# Patient Record
Sex: FEMALE | Race: WHITE | NOT HISPANIC OR LATINO | URBAN - METROPOLITAN AREA
[De-identification: names, ages, dates, MRNs, and addresses within clinical notes are randomized per-mention and may not be internally consistent; named-entity substitution may affect disease eponyms.]

---

## 2018-09-17 DIAGNOSIS — Q20.3 COMPLETE TRANSPOSITION OF GREAT VESSELS: Primary | ICD-10-CM

## 2018-09-20 ENCOUNTER — DOCUMENTATION ONLY (OUTPATIENT)
Dept: PEDIATRIC CARDIOLOGY | Facility: CLINIC | Age: 38
End: 2018-09-20

## 2018-09-20 NOTE — PROGRESS NOTES
Referred from Dr. Dumont.  Siria Villegas is a 37 y.o. female with:  1.  L- TGA  2.  Complete heart block   - medtronic device in place  3.  History of episodes of chest tightness, shortness of breath, dizziness, and right arm tingling    On lasix daily, digoxin  mcg, propafenone, metoprolol.

## 2018-09-24 RX ORDER — NORGESTIMATE AND ETHINYL ESTRADIOL 7DAYSX3 28
1 KIT ORAL DAILY
COMMUNITY
Start: 2018-09-10

## 2018-09-24 RX ORDER — CLINDAMYCIN HYDROCHLORIDE 300 MG/1
CAPSULE ORAL
COMMUNITY
Start: 2018-07-26 | End: 2018-10-18

## 2018-09-24 RX ORDER — FUROSEMIDE 20 MG/1
20 TABLET ORAL DAILY
COMMUNITY
Start: 2018-07-31 | End: 2023-05-01 | Stop reason: SDUPTHER

## 2018-09-24 RX ORDER — METOPROLOL SUCCINATE 25 MG/1
25 TABLET, EXTENDED RELEASE ORAL 2 TIMES DAILY
COMMUNITY
Start: 2018-09-02 | End: 2024-02-19 | Stop reason: SDUPTHER

## 2018-09-24 RX ORDER — PROPAFENONE HYDROCHLORIDE 150 MG/1
150 TABLET, COATED ORAL 2 TIMES DAILY
COMMUNITY
Start: 2018-08-27

## 2018-09-24 RX ORDER — DIGOXIN 250 MCG
250 TABLET ORAL DAILY
COMMUNITY
Start: 2018-09-10 | End: 2023-05-29 | Stop reason: SDUPTHER

## 2018-10-18 ENCOUNTER — HOSPITAL ENCOUNTER (OUTPATIENT)
Dept: CARDIOLOGY | Facility: CLINIC | Age: 38
Discharge: HOME | End: 2018-10-18
Payer: COMMERCIAL

## 2018-10-18 ENCOUNTER — OFFICE VISIT (OUTPATIENT)
Dept: CARDIOLOGY | Facility: CLINIC | Age: 38
End: 2018-10-18
Payer: COMMERCIAL

## 2018-10-18 VITALS
HEART RATE: 72 BPM | SYSTOLIC BLOOD PRESSURE: 109 MMHG | BODY MASS INDEX: 31.37 KG/M2 | OXYGEN SATURATION: 96 % | HEIGHT: 68 IN | WEIGHT: 207 LBS | DIASTOLIC BLOOD PRESSURE: 53 MMHG

## 2018-10-18 DIAGNOSIS — R53.83 OTHER FATIGUE: ICD-10-CM

## 2018-10-18 DIAGNOSIS — I36.1 NON-RHEUMATIC TRICUSPID VALVE INSUFFICIENCY: ICD-10-CM

## 2018-10-18 DIAGNOSIS — I44.2 COMPLETE HEART BLOCK: ICD-10-CM

## 2018-10-18 DIAGNOSIS — Q20.5 CONGENITALLY CORRECTED TRANSPOSITION OF GREAT ARTERIES: ICD-10-CM

## 2018-10-18 DIAGNOSIS — Q20.3 COMPLETE TRANSPOSITION OF GREAT VESSELS: ICD-10-CM

## 2018-10-18 DIAGNOSIS — Q24.9 ADULT CONGENITAL HEART DISEASE: ICD-10-CM

## 2018-10-18 LAB
DIASTOLIC DYSFUNCTION: NO
GLOBAL PERICARDIAL EFFUSION: NORMAL
MITRAL VALVE MOBILITY: NORMAL
MITRAL VALVE REGURGITATION: NORMAL
RETIRED EF AND QEF - SEE NOTES: 65 (ref 55–65)
TRICUSPID VALVE REGURGITATION: NORMAL

## 2018-10-18 PROCEDURE — 3008F BODY MASS INDEX DOCD: CPT | Mod: CPTII,S$GLB,, | Performed by: PEDIATRICS

## 2018-10-18 PROCEDURE — 99999 PR PBB SHADOW E&M-EST. PATIENT-LVL III: CPT | Mod: PBBFAC,,, | Performed by: PEDIATRICS

## 2018-10-18 PROCEDURE — 93000 ELECTROCARDIOGRAM COMPLETE: CPT | Mod: S$GLB,,, | Performed by: INTERNAL MEDICINE

## 2018-10-18 PROCEDURE — 93303 ECHO TRANSTHORACIC: CPT | Mod: S$GLB,,, | Performed by: PEDIATRICS

## 2018-10-18 PROCEDURE — 93325 DOPPLER ECHO COLOR FLOW MAPG: CPT | Mod: S$GLB,,, | Performed by: PEDIATRICS

## 2018-10-18 PROCEDURE — 93320 DOPPLER ECHO COMPLETE: CPT | Mod: S$GLB,,, | Performed by: PEDIATRICS

## 2018-10-18 PROCEDURE — 99205 OFFICE O/P NEW HI 60 MIN: CPT | Mod: 25,S$GLB,, | Performed by: PEDIATRICS

## 2018-10-18 RX ORDER — FERROUS GLUCONATE 324(38)MG
324 TABLET ORAL DAILY
COMMUNITY
End: 2023-07-10

## 2018-10-18 RX ORDER — GUAIFENESIN 600 MG/1
1200 TABLET, EXTENDED RELEASE ORAL DAILY
COMMUNITY
End: 2023-07-10

## 2018-10-18 RX ORDER — ASPIRIN 81 MG/1
81 TABLET ORAL DAILY
COMMUNITY

## 2018-10-18 RX ORDER — NAPROXEN 500 MG/1
500 TABLET ORAL 2 TIMES DAILY WITH MEALS
COMMUNITY
Start: 2018-10-04 | End: 2023-07-10

## 2018-10-18 NOTE — PROGRESS NOTES
10/18/2018    re:Siria Villegas  :1980     Brooks Dumont MD  [address]  [facility] [place] [address]    Dr. Caden Worley    [facility]    Siria Villegas is a 37 y.o. female is seen today for an initial consultation in my [facility] at the request of Dr. Matt Dumont, her primary cardiologist in [place].  To summarize, her diagnoses are as follows:  1.  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  Likely mildly reduced systemic right ventricular function - typical for this disease  3.  Normal appearing tricuspid valve with only mild tricuspid insufficiency  4.  History of supraventricular arrhythmias  5.  Complete heart block with transvenous pacemaker  6.  Multiple nonspecific complaints, doubt cardiac etiology.    My recommendations are as follows:  1.  Healthy diet, regular low to moderate intensity exercise.  2.  Continue regular follow-up with Dr. Dumont and Brunilda.  They will contact me with any new concerns.  3.  Consider a trial off of metoprolol to see if it helps her fatigue.  I spoke with Dr. Dumont about this, and he will discuss with Dr. Worley and get back to the patient.  4.  Stop Lasix.  Restart this medication of peripheral edema or worsening exercise tolerance developed.    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  That said, her heart looks great.  Quantifying systemic right ventricular function with echo is difficult, but overall the function looks quite good (I would graded as mildly reduced, but this is par for the course with her disease).  Her tricuspid valve, which is often quite normal with this type of congenital heart disease, looks great.  She has at most mild tricuspid insufficiency.  Complete heart block is quite common with her  heart disease, and this has been well managed.  It sounds like she has a significant arrhythmia history.  I wonder if she would feel better off the metoprolol, but I have left that up to her primary cardiologist to discuss.  We had a long discussion about potential issues related to her heart:  1.  We talked about the risk of worsening right ventricular function and heart failure.  This could progress to a point where heart transplant would be discussed.  I considered adding an ACE-inhibitor.  However, the data for the utility of Ace inhibitors for systemic right ventricular function is lacking, her blood pressure is already on the low side, and I am concerned about worsening some of her subjective symptoms.  At present, I decided against an ACE-inhibitor.  2.  We talked about the risk of progressive tricuspid valve insufficiency.  This can have a deleterious effect on heart function, and a vicious cycle of worsening heart function leading to worsening tricuspid insufficiency, leading back to worsening heart function can occur.  Tricuspid valve replacement is sometimes necessary in these patients.  However, her valve really looks great.  There is absolutely no need for intervention at this time. She does not appear to have an Ebsteinoid valve, and I am hopeful that no intervention will be necessary.  3.  We talked about pregnancy outcomes.  There is not a lot of data, but there are several small series that suggest excellent outcomes with patients with her heart disease.  Given her good right ventricular function and minimal tricuspid insufficiency, I suspect that she would tolerate a pregnancy quite well and be able to deliver vaginally.  However, she would absolutely be at risk for heart failure symptoms and arrhythmias during the pregnancy, and she would need to be followed closely.    History of present illness:  The history is provided by the patient.  I also reviewed extensive medical records from Dr. Dumont in  [place], and I spoke directly with him on the phone.  She was diagnosed with L-TGA at about 7 years of age.  Her only cardiac interventions have been pacemakers.  The 1st was placed when she was 8.  Her most recent generator change was in July 2018.  Amazingly, it sounds like all of her pacemakers were transvenous.  She has multiple complaints.  She does have some dyspnea on exertion.  If anything, this is actually better over the past few years.  This is attributed to weight loss.  She denies chest pain.  She denies syncope.  She denies edema. She does frequently get dizzy and lightheaded, and she has occasional palpitations.  Her biggest complaint is fatigue.  She sometimes will go over a week without taking her Lasix as she cannot take it at school during testing week (she is unable to leave the room for bathroom breaks).  She does not notice any worsening edema or shortness of breath when she does not take the Lasix.    The review of systems is as noted above. It is otherwise negative for other symptoms related to the general, neurological, psychiatric, endocrine, gastrointestinal, genitourinary, respiratory, dermatologic, musculoskeletal, hematologic, and immunologic systems.    Past Medical History:   Diagnosis Date    Asthma     Heart murmur     Transposition of the great vessels with intact ventricular septum and left ventricular outflow tract obstruct      Past Surgical History:   Procedure Laterality Date    CARDIAC PACEMAKER PLACEMENT  07/26/2018    CYST REMOVAL      age 2 years old, from eyebrow area     Family History   Problem Relation Age of Onset    Cancer Mother     Stroke Mother     Heart failure Mother     Asthma Father     Hearing loss Father     No Known Problems Sister     No Known Problems Brother     No Known Problems Maternal Aunt     No Known Problems Maternal Uncle     No Known Problems Paternal Aunt     No Known Problems Paternal Uncle     No Known Problems Maternal  Grandmother     No Known Problems Maternal Grandfather     No Known Problems Paternal Grandmother     No Known Problems Paternal Grandfather     Anemia Neg Hx     Arrhythmia Neg Hx     Clotting disorder Neg Hx     Fainting Neg Hx     Heart attack Neg Hx     Heart disease Neg Hx     Hyperlipidemia Neg Hx     Hypertension Neg Hx     Atrial Septal Defect Neg Hx     Cardiomyopathy Neg Hx     Congenital heart disease Neg Hx     Early death Neg Hx     Heart attacks under age 50 Neg Hx      Social History     Socioeconomic History    Marital status: Unknown     Spouse name: None    Number of children: None    Years of education: None    Highest education level: None   Social Needs    Financial resource strain: None    Food insecurity - worry: None    Food insecurity - inability: None    Transportation needs - medical: None    Transportation needs - non-medical: None   Occupational History    None   Tobacco Use    Smoking status: Never Smoker    Smokeless tobacco: Never Used   Substance and Sexual Activity    Alcohol use: No     Frequency: Never    Drug use: No    Sexual activity: None   Other Topics Concern    None   Social History Narrative    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     Current Outpatient Medications on File Prior to Visit   Medication Sig Dispense Refill    aspirin (ECOTRIN) 81 MG EC tablet Take 81 mg by mouth once daily.       digoxin (LANOXIN) 250 mcg tablet Take 250 mcg by mouth once daily. 1/2 tablet in the morning and 250 mcg at night      furosemide (LASIX) 20 MG tablet Take 20 mg by mouth once daily.       guaiFENesin (MUCINEX) 600 mg 12 hr tablet Take 1,200 mg by mouth once daily.      metoprolol succinate (TOPROL-XL) 25 MG 24 hr tablet Take 25 mg by mouth 2 (two) times daily.       naproxen (NAPROSYN) 500 MG tablet Take 500 mg by mouth 2 (two) times daily with meals.       propafenone (RHTHYMOL) 150 MG Tab Take 150 mg by mouth 2 (two) times daily.  "One tablet in the morning and two at night      TRI-SPRINTEC, 28, 0.18/0.215/0.25 mg-35 mcg (28) tablet Take 1 tablet by mouth once daily.       ferrous gluconate (FERGON) 324 MG tablet Take 324 mg by mouth once daily.      [DISCONTINUED] clindamycin (CLEOCIN) 300 MG capsule        No current facility-administered medications on file prior to visit.      Review of patient's allergies indicates:   Allergen Reactions    Penicillins Hives     As a child.     Vitals:    10/18/18 1054 10/18/18 1055   BP: (!) 106/54 (!) 109/53   BP Location: Right arm Left arm   Patient Position: Sitting Sitting   BP Method: Large (Automatic) Large (Automatic)   Pulse: 72 72   SpO2: 96%    Weight: 93.9 kg (207 lb 0.2 oz)    Height: 5' 8" (1.727 m)      BP (!) 109/53 (BP Location: Left arm, Patient Position: Sitting, BP Method: Large (Automatic))   Pulse 72   Ht 5' 8" (1.727 m)   Wt 93.9 kg (207 lb 0.2 oz)   SpO2 96%   BMI 31.48 kg/m²   In general, she is an obese but otherwise very healthy-appearing nondysmorphic female in no apparent distress.  The eyes, nares, and oropharynx are clear.  Eyelids and conjunctiva are normal without drainage or erythema.  Pupils equal and round bilaterally.  The head is normocephalic and atraumatic.  The neck is supple without jugular venous distention or thyroid enlargement.  The lungs are clear to auscultation bilaterally.  There are no scars on the chest wall.  The point of maximum impulse is somewhat more midline than typical.  The 1st heart sound is normal. The 2nd heart sound is single.  I do not hear a murmur.  The pacemaker is positioned in the right upper chest.  The abdominal exam is benign without hepatosplenomegaly, tenderness, or distention.  Pulses are normal in all 4 extremities with brisk capillary refill and no clubbing, cyanosis, or edema.  No rashes are noted.    I personally reviewed the following tests performed today and my interpretation follows:  An EKG performed in clinic " today reveals atrial sensing and ventricular pacing with good capture.  I reviewed an echocardiogram performed in clinic today.  The complete report is pending.  The right ventricle is somewhat dilated we what I would estimate as mildly reduced function.  Movement of the septum is somewhat sluggish, but the rest of the right ventricle moves very well.  The tricuspid valve looks completely normal with only mild tricuspid insufficiency.  There is no evidence of pulmonary hypertension, and the subpulmonic left ventricular function is completely normal. Trivial mitral insufficiency estimates normal right ventricular and pulmonary arterial pressures.  There is no effusion.  There is no ASD or VSD.    I reviewed blood work performed in [place] over the past year.  There were no significant abnormalities.    Thank you for referring this patient to our clinic.  Please call with any questions.    Sincerely,        Conor Bernardo MD  Pediatric Cardiology  Adult Congenital Heart Disease  Pediatric Heart Failure and Transplantation  [facility] [address] [place]  [address]  [phone]

## 2022-05-03 ENCOUNTER — DOCUMENTATION ONLY (OUTPATIENT)
Dept: PEDIATRIC CARDIOLOGY | Facility: CLINIC | Age: 42
End: 2022-05-03

## 2023-01-09 ENCOUNTER — RESEARCH ENCOUNTER (OUTPATIENT)
Dept: RESEARCH | Facility: HOSPITAL | Age: 43
End: 2023-01-09

## 2023-01-09 NOTE — PROGRESS NOTES
Trial: SOCRATES, 2021.237  PI: Dr. Bernardo    This note is to indicate that the patient self-enrolled in the CHI-JOSHUA study (Congenital Heart Initiative - Redefining Outcomes and Navigation to Adult Centered Care). This study is looking to improve care for future adult congenital heart defect patients. The trial consists of surveys periodically that the patient completes independently.     Please contact NICOLA Burton or Dr. Conor Bernardo for questions.   Trial Number: [id number]  Trial Email: [email or web address]

## 2023-05-01 ENCOUNTER — TELEPHONE (OUTPATIENT)
Dept: PEDIATRIC CARDIOLOGY | Facility: CLINIC | Age: 43
End: 2023-05-01

## 2023-05-01 DIAGNOSIS — Q20.3 TRANSPOSITION OF GREAT ARTERIES: Primary | ICD-10-CM

## 2023-05-01 RX ORDER — FUROSEMIDE 20 MG/1
20 TABLET ORAL DAILY
Qty: 30 TABLET | Refills: 0 | Status: SHIPPED | OUTPATIENT
Start: 2023-05-01 | End: 2023-07-03

## 2023-05-01 NOTE — TELEPHONE ENCOUNTER
Sent in refill(s) via ePrescribe to designated/requested pharmacy.     Please call pt to schedule a follow up apt

## 2023-05-01 NOTE — TELEPHONE ENCOUNTER
Prescription Refill Request    Drug: FUROMESIDE 20MG TABLETS  Directions: TAKE 1 TABLET BY MOUTH EVERY DAY  QTY: 90  Last Refill: 03/15/2023    Rx #: [id number]

## 2023-05-22 ENCOUNTER — TELEPHONE (OUTPATIENT)
Dept: PEDIATRIC CARDIOLOGY | Facility: CLINIC | Age: 43
End: 2023-05-22
Payer: COMMERCIAL

## 2023-05-29 ENCOUNTER — TELEPHONE (OUTPATIENT)
Dept: PEDIATRIC CARDIOLOGY | Facility: CLINIC | Age: 43
End: 2023-05-29
Payer: COMMERCIAL

## 2023-05-29 DIAGNOSIS — Q20.3 TRANSPOSITION OF GREAT ARTERIES: Primary | ICD-10-CM

## 2023-05-29 DIAGNOSIS — Q20.3 TRANSPOSITION OF GREAT ARTERIES: ICD-10-CM

## 2023-05-29 RX ORDER — DIGOXIN 250 MCG
250 TABLET ORAL DAILY
Qty: 30 TABLET | Refills: 0 | Status: SHIPPED | OUTPATIENT
Start: 2023-05-29 | End: 2023-05-29

## 2023-05-29 RX ORDER — DIGOXIN 250 MCG
TABLET ORAL
Qty: 30 TABLET | Refills: 0 | Status: SHIPPED | OUTPATIENT
Start: 2023-05-29 | End: 2023-07-03

## 2023-05-29 NOTE — TELEPHONE ENCOUNTER
Sent in one month supply via [employer] to designated/requested pharmacy. Patient due for follow up. MA attempted to schedule follow up with no answer, left message for pt.

## 2023-05-29 NOTE — TELEPHONE ENCOUNTER
Called patient and left message to schedule 1 year follow up from their 05/03/2022 appointment. Patient is requesting refill on digoxin 0.25 MG tablets (white) sent to:  [facility]  [address] [facility]  P: [phone]  F: [phone]

## 2023-06-27 NOTE — PROGRESS NOTES
DAVE SPENCE   41 Y old Female, : 1980   Account Number: [id number]   [id number] [id number]   Home: [phone]    Guarantor: DAVE SPENCE Insurance: [employer] [place]   PCP: Nick Elaine Referring: Isac Cunningham   Appointment Facility: [facility]     2022 Progress Notes: OSWALD Dumont MD          Reason for Appointment   1. Transposition of the great vessels established patient   History of Present Illness   Transposition of the great vessels:   I had the pleasure of seeing this patient in the pediatric cardiology office today. As you may recall, the patient is a 41 year old whom we follow with l-Transposition of the great arteries and complete heart block status post pacemaker placement. Her Medtronic pacemaker placement was on 2019. The patient was last seen 1 year ago and returns today for follow up. She continues on Lasix 20 mg and reports medication compliance with her last dose taken 1 week ago. 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 Her most recent pacemaker interrogation was in 2022 where the patient reports nothing abnormal. These results are not yet available to my office. The patient reports she wanted to start light weight lifting but is planning to see an orthopaedic about a slight pain in her right arm. There are no complaints of chest pain, arrhythmia, syncope, tachycardia, palpitations, or exercise intolerance.    Current Medications   Taking    Zoloft(Sertraline HCl)    Digoxin 250 MCG Tablet as directed Orally Once a day   Propafenone HCl Orally    Metoprolol 25 mg tablet orally three times a day (tid)   Flonase(Fluticasone Propionate) , Notes: PRN   Vitamin D Orally    Aspirin 81 mg 81 mg Tablet orally daily   Iron Orally    Birth Control    Lasix(Furosemide) 20 MG Tablet 1 tablet Orally Once a day   Medication List  reviewed and reconciled with the patient      Past Medical History   l-Transposition of the great arteries.     Tricuspid insufficiency (ststemic AV valve), mild.     Right ventricular enlargement (systemic ventricle), moderate with adequate contractility.     Complete Heart Block: Status post pacemaker placement (followed by Dr. Dmitriy Pacheco).     Atrial / supraventricular tachycardia - managed by adult EP (Dr. Pacheco).     ACHD evaluation with Dr. Conor Bernardo 2018.     Surgical History   Pacemaker placement x 5; now with Medtronic Thera DDD/R    Laparascopic pelvic surgery, [facility], Dr. Leno Cunningham 2009   Cyst removal as an infant    Elbow surgery in the past    Lead Revision with Dr. Junior SEARS 2020   Family History   Mother: , diagnosed with Cancer, Stroke, Heart Disease   Father: alive, diagnosed with Hypercholesterolemia, Hypertension   Paternal Grand Mother: , diagnosed with Myocardial Infarction, Cancer   Maternal Grand Mother: diagnosed with Heart Disease   3 brother(s) - healthy.    There is no direct family history of congenital heart disease, sudden death, arrhythmia, diabetes, cancer, or other inheritable disorders.   Social History   Observations: no.   Language: no language barriers.   Tobacco Use Are you a: never smoker.   Smokers in the household: No.   Exercise/activities: Walking.   Caffeine: yes, tea, coffee.   Alcohol: occasional.   Drugs: no.    Allergies   Penicillin   Hospitalization/Major Diagnostic Procedure   Hospitalizations only as related to cardiac history above    Review of Systems   Genetics:   Syndrome none.   Constitutional:   Fatigue feels tired in the afternoon. Fever none. Loss of appetite none. Weakness none. Weight no problems reported.   Neurologic:   Syncope none. Dizziness yes. Headaches none. Seizures absent.   Ophthalmologic:   Contacts or glasses none. Diminished vision none.   Ear, nose, throat:   Eyes no problems present. Mouth and  throat no problems noted. Upper airway obstruction none present. Nasal congestion none.   Respiratory:   Asthma none. Tachypnea none. Cough none. Shortness of breath none. Wheezing none.   Cardiovascular:   See HPI for details.   Gastrointestinal:   Stomach no nausea or vomiting. Bowel no diarrhea, no constipation. Abdomen No complaints.   Endocrine:   Thyroid disease none. Diabetes none.   Genitourinary:   Renal disease no problems noted. Urinary tract infection none.   Musculoskeletal:   Joint pain none. Joint swelling none. Muscle no cramping, aching, or stiffness.   Dermatologic:   Itching none. Rash none.   Hematology, oncology:   Anemia none. Abnormal bleeding none. Clotting disorder none.   Allergy:   Seasonal/Environmental none. Food none. Latex none.   Psychology:   ADD or ADHD none . Nervousness none . Mental Illness none . Anxiety none. Depression none.      Vital Signs   Ht 174 cm, Wt 98.1 kg, BMI 32.40, heart rate (HR) 65 bpm, blood pressure (BP) Right Arm: 117/61 mmHg, respiratory rate (RR) 16.   Physical Examination   General:   General Appearance: pleasant, well nourished, no acute distress.   HEENT:   Head: atraumatic, normocephalic.   Neck:   Neck: supple. Range of Motion: normal.   Chest:   Shape and Expansion: equal expansion bilaterally, no retractions, no grunting. Chest wall: no gross deformities, no tenderness. Breath Sounds: clear to auscultation, no wheezing, rhonchi, crackles, or stridor. Crackles: none. Wheezes: none.   Heart:   Inspection: normal and acyanotic. Palpation: normal point of maximal impulse. Rate: regular. Rhythm: regular. S1: normal. S2: physiologically split. Clicks: none. Systolic murmurs: I/VI, systolic, crescendo descrescendo, medium pitch, left mid sternal border. Diastolic murmurs: none present. Rubs, Gallops: none. Pulses: radial and brachial artery pulses full and equal.   Abdomen:   Shape: mild obese. Palpation soft. Tenderness: none.   Musculoskeletal:   Upper  extremities: normal. Lower extremities: normal.   Extremities:   Clubbing: no. Cyanosis: no. Edema: no. Pulses: 2+ bilaterally.   Neurological:   Coordination: normal.      Assessments      1. Transposition of great arteries - Q20.3 (Primary)   2. Cardiomegaly - I51.7   3. Atrioventricular block, complete - I44.2   In summary, Siria has l-Transposition of the great arteries and associated complete heart block status post pacemaker placement. Her systemic right ventricular function has remained stable since her last echocardiogram. Siria reports no arrhythmias and gets her pacemaker interrogated by Dr. Dmitriy Pacheco every 6 months. I made no changes to her medications today. She should return in 1 year for routine cardiology follow-up. There is no need for activity restrictions from a cardiovascular standpoint except for pacemaker precautions. Please call me with any questions regarding this patient. (CC: Dr. Dmitriy Pacheco).   Treatment   1. Transposition of great arteries   Continue Digoxin Tablet, 250 MCG, 1 tablet, Orally, Once a day, 90 days, 90 Tablet, Refills 4  Continue Propafenone HCl Tablet, 150 MG, 1 tablet in AM and 2 tablets in PM, Orally, as directed, 90 days, 270 Tablet, Refills 4  Continue Metoprolol tablet, 25 mg, 1 tablet, orally, Twice daily (BID), 90 days, 180 Tablet, Refills 4     2. Others   Continue Lasix Tablet, 20 MG, 1 tablet, Orally, Once a day, 90 days, 90 Tablet, Refills 4   Procedures   Electrocardiogram:   Findings EKG with magnet: AV pacing; EKG without magnet: A sensing V pacing.   Echocardiogram:   Findings l-transposition of the great arteries (congenitally corrected transposition). No septal defects. Mild tricuspid insuffiency. Moderate to severe right ventricular enlargement (systemic ventricle). Mildly depressed right ventricular contractility (systemic ventricle). No pericardial effusion.               Preventive Medicine   Counseling: Exercise - No activity restrictions  except pacemaker precautions. SBE prophylaxis - Indicated for potentially bacteremic situations.    Procedure Codes   98275 Doppler Complete   06001 Color Flow   04733 2D Congenital Complete   43783 Electrocardiogram (global)   Follow Up   1 Year (Reason: VS, ekg, echo)               Electronically signed by Brooks Dumont MD on 05/08/2022 at 05:07 PM CDT   Sign off status: Completed

## 2023-07-01 DIAGNOSIS — Q20.3 TRANSPOSITION OF GREAT ARTERIES: ICD-10-CM

## 2023-07-02 DIAGNOSIS — Q20.3 TRANSPOSITION OF GREAT ARTERIES: ICD-10-CM

## 2023-07-03 RX ORDER — FUROSEMIDE 20 MG/1
TABLET ORAL
Qty: 30 TABLET | Refills: 0 | Status: SHIPPED | OUTPATIENT
Start: 2023-07-03 | End: 2023-07-10 | Stop reason: SDUPTHER

## 2023-07-03 RX ORDER — DIGOXIN 250 MCG
TABLET ORAL
Qty: 30 TABLET | Refills: 0 | Status: SHIPPED | OUTPATIENT
Start: 2023-07-03 | End: 2023-07-10 | Stop reason: SDUPTHER

## 2023-07-10 ENCOUNTER — OFFICE VISIT (OUTPATIENT)
Dept: PEDIATRIC CARDIOLOGY | Facility: CLINIC | Age: 43
End: 2023-07-10
Payer: COMMERCIAL

## 2023-07-10 VITALS
DIASTOLIC BLOOD PRESSURE: 57 MMHG | SYSTOLIC BLOOD PRESSURE: 112 MMHG | BODY MASS INDEX: 34.5 KG/M2 | RESPIRATION RATE: 30 BRPM | HEART RATE: 87 BPM | WEIGHT: 227.63 LBS | HEIGHT: 68 IN

## 2023-07-10 DIAGNOSIS — I36.1 NON-RHEUMATIC TRICUSPID VALVE INSUFFICIENCY: ICD-10-CM

## 2023-07-10 DIAGNOSIS — Q24.9 ADULT CONGENITAL HEART DISEASE: Primary | ICD-10-CM

## 2023-07-10 DIAGNOSIS — Q20.5 CONGENITALLY CORRECTED TRANSPOSITION OF GREAT ARTERIES: ICD-10-CM

## 2023-07-10 DIAGNOSIS — I48.0 PAROXYSMAL ATRIAL FIBRILLATION: ICD-10-CM

## 2023-07-10 DIAGNOSIS — I44.2 COMPLETE HEART BLOCK: ICD-10-CM

## 2023-07-10 DIAGNOSIS — R53.83 OTHER FATIGUE: ICD-10-CM

## 2023-07-10 PROCEDURE — 93000 PR ELECTROCARDIOGRAM, COMPLETE: ICD-10-PCS | Mod: S$GLB,,, | Performed by: PEDIATRICS

## 2023-07-10 PROCEDURE — 99214 PR OFFICE/OUTPT VISIT, EST, LEVL IV, 30-39 MIN: ICD-10-PCS | Mod: 25,S$GLB,, | Performed by: PEDIATRICS

## 2023-07-10 PROCEDURE — 93000 ELECTROCARDIOGRAM COMPLETE: CPT | Mod: S$GLB,,, | Performed by: PEDIATRICS

## 2023-07-10 PROCEDURE — 99999 PR PBB SHADOW E&M-EST. PATIENT-LVL III: CPT | Mod: PBBFAC,,, | Performed by: PEDIATRICS

## 2023-07-10 PROCEDURE — 1159F PR MEDICATION LIST DOCUMENTED IN MEDICAL RECORD: ICD-10-PCS | Mod: CPTII,S$GLB,, | Performed by: PEDIATRICS

## 2023-07-10 PROCEDURE — 3074F PR MOST RECENT SYSTOLIC BLOOD PRESSURE < 130 MM HG: ICD-10-PCS | Mod: CPTII,S$GLB,, | Performed by: PEDIATRICS

## 2023-07-10 PROCEDURE — 1160F PR REVIEW ALL MEDS BY PRESCRIBER/CLIN PHARMACIST DOCUMENTED: ICD-10-PCS | Mod: CPTII,S$GLB,, | Performed by: PEDIATRICS

## 2023-07-10 PROCEDURE — 99999 PR PBB SHADOW E&M-EST. PATIENT-LVL III: ICD-10-PCS | Mod: PBBFAC,,, | Performed by: PEDIATRICS

## 2023-07-10 PROCEDURE — 3008F BODY MASS INDEX DOCD: CPT | Mod: CPTII,S$GLB,, | Performed by: PEDIATRICS

## 2023-07-10 PROCEDURE — 3074F SYST BP LT 130 MM HG: CPT | Mod: CPTII,S$GLB,, | Performed by: PEDIATRICS

## 2023-07-10 PROCEDURE — 3008F PR BODY MASS INDEX (BMI) DOCUMENTED: ICD-10-PCS | Mod: CPTII,S$GLB,, | Performed by: PEDIATRICS

## 2023-07-10 PROCEDURE — 99214 OFFICE O/P EST MOD 30 MIN: CPT | Mod: 25,S$GLB,, | Performed by: PEDIATRICS

## 2023-07-10 PROCEDURE — 3078F DIAST BP <80 MM HG: CPT | Mod: CPTII,S$GLB,, | Performed by: PEDIATRICS

## 2023-07-10 PROCEDURE — 1160F RVW MEDS BY RX/DR IN RCRD: CPT | Mod: CPTII,S$GLB,, | Performed by: PEDIATRICS

## 2023-07-10 PROCEDURE — 1159F MED LIST DOCD IN RCRD: CPT | Mod: CPTII,S$GLB,, | Performed by: PEDIATRICS

## 2023-07-10 PROCEDURE — 3078F PR MOST RECENT DIASTOLIC BLOOD PRESSURE < 80 MM HG: ICD-10-PCS | Mod: CPTII,S$GLB,, | Performed by: PEDIATRICS

## 2023-07-10 RX ORDER — FUROSEMIDE 20 MG/1
20 TABLET ORAL DAILY
Qty: 90 TABLET | Refills: 3 | Status: SHIPPED | OUTPATIENT
Start: 2023-07-10 | End: 2023-08-15 | Stop reason: SDUPTHER

## 2023-07-10 RX ORDER — SERTRALINE HYDROCHLORIDE 25 MG/1
25 TABLET, FILM COATED ORAL DAILY
COMMUNITY
End: 2023-10-19 | Stop reason: DRUGHIGH

## 2023-07-10 RX ORDER — DIGOXIN 250 MCG
125 TABLET ORAL DAILY
Qty: 45 TABLET | Refills: 3 | Status: SHIPPED | OUTPATIENT
Start: 2023-07-10 | End: 2024-07-09

## 2023-07-10 NOTE — ASSESSMENT & PLAN NOTE
In summary, Siria has l-Transposition of the great arteries and associated complete heart block status post pacemaker placement. Her systemic right ventricular function is mildly depressed but has remained stable since her last echocardiogram. Siria reports no arrhythmias and gets her pacemaker interrogated by Dr. Dmitriy Pacheco every 6 months. I made no changes to her medications today. She should return in 1 year for routine cardiology follow-up. There is no need for activity restrictions from a cardiovascular standpoint except for pacemaker precautions. Please call me with any questions regarding this patient. (CC: Dr. Dmitriy Pacheco).

## 2023-07-10 NOTE — ASSESSMENT & PLAN NOTE
Monitor for now  No CHF or evidence of bradycardia/arrhythmia  Follow up PCP for routine evaluation if symptoms persist

## 2023-07-10 NOTE — PROGRESS NOTES
Thank you for referring your patient Siria Villegas to the Pediatric Cardiology clinic for consultation. Please review my findings below and feel free to contact for me for any questions or concerns.    Siria Villegas is a 42 y.o. female seen in clinic today for Transposition of the Great Arteries     ASSESSMENT/PLAN:  1. Adult congenital heart disease  -     Pediatric Echo; Future    2. Congenitally corrected transposition of great arteries  Assessment & Plan:  In summary, Siria has l-Transposition of the great arteries and associated complete heart block status post pacemaker placement. Her systemic right ventricular function is mildly depressed but has remained stable since her last echocardiogram. Siria reports no arrhythmias and gets her pacemaker interrogated by Dr. Dmitriy Pacheco every 6 months. I made no changes to her medications today. She should return in 1 year for routine cardiology follow-up. There is no need for activity restrictions from a cardiovascular standpoint except for pacemaker precautions. Please call me with any questions regarding this patient. (CC: Dr. Dmitriy Pacheco).    Orders:  -     Pediatric Echo; Future  -     furosemide (LASIX) 20 MG tablet; Take 1 tablet (20 mg total) by mouth once daily.  Dispense: 90 tablet; Refill: 3  -     digoxin (LANOXIN) 250 mcg tablet; Take 0.5 tablets (125 mcg total) by mouth once daily.  Dispense: 45 tablet; Refill: 3    3. Non-rheumatic tricuspid valve insufficiency  Assessment & Plan:  Stable, mild plus tricuspid insufficiency (systemic AV valve)    Orders:  -     Pediatric Echo; Future    4. Complete heart block  Overview:  Followed by Dr. Dmitriy Pacheco    Assessment & Plan:  Dual-chamber pacemaker normal function  100% ventricular pacing        5. Paroxysmal atrial fibrillation  Overview:  Followed by Dr. Dmitriy Pacheco    Assessment & Plan:  No recurrence on continued antiarrhythmic therapy  Propafenone, metoprolol, digoxin      6. Other  fatigue  Assessment & Plan:  Monitor for now  No CHF or evidence of bradycardia/arrhythmia  Follow up PCP for routine evaluation if symptoms persist      Preventive Medicine:  SBE prophylaxis - Indicated for potentially bacteremic situations  Exercise - No activity restrictions    Follow Up:  Follow up in about 1 year (around 7/10/2024) for Recheck with EKG and Echo.      SUBJECTIVE:  GRANT Beverly is a 42 y.o. whom we follow with l-Transposition of the great arteries and complete heart block status post pacemaker placement. Her Medtronic pacemaker placement was on February 2, 2019. The patient was last seen over a year ago and returns today for follow up. She continues on Digoxin 150 mcg daily (dose recently decreased by Dr. Pacheco in April or May) , propafenone  mg 1 tab AM and 2 tabs PM, metoprolol 25 mg BID, and Lasix 20 mg daily. She reports medication compliance with her last dose taken this morning. Her most recent pacemaker interrogation was in either April or May of this year and demonstrated normal results per patient. Next virtual interrogation is scheduled for 7/13. Patient reports feeling ill over the weekend with diarrhea, shortness of breath, lethargy, and fatigue. At that time, patient checked her pulse ox at home, readings were 82-86%. Patient rested and increased fluid intake, upon wakening pulse ox readings increased to 95-96%. Patient reports the shortness of breath has improved, but she does still notice it with exertion and remains fatigued. There are no complaints of chest pain, palpitations, decreased activity, exercise intolerance, tachycardia, dizziness, syncope, or documented arrhythmias.    Review of patient's allergies indicates:   Allergen Reactions    Penicillins Hives     As a child.       Current Outpatient Medications:     aspirin (ECOTRIN) 81 MG EC tablet, Take 81 mg by mouth once daily. , Disp: , Rfl:     metoprolol succinate (TOPROL-XL) 25 MG 24 hr tablet, Take 25 mg by  "mouth 2 (two) times daily. , Disp: , Rfl:     propafenone (RHTHYMOL) 150 MG Tab, Take 150 mg by mouth 2 (two) times daily. One tablet in the morning and two at night, Disp: , Rfl:     sertraline (ZOLOFT) 25 MG tablet, Take 25 mg by mouth once daily., Disp: , Rfl:     TRI-SPRINTEC, 28, 0.18/0.215/0.25 mg-35 mcg (28) tablet, Take 1 tablet by mouth once daily. , Disp: , Rfl:     digoxin (LANOXIN) 250 mcg tablet, Take 0.5 tablets (125 mcg total) by mouth once daily., Disp: 45 tablet, Rfl: 3    furosemide (LASIX) 20 MG tablet, Take 1 tablet (20 mg total) by mouth once daily., Disp: 90 tablet, Rfl: 3    Past Medical History:   Diagnosis Date    Asthma       Past Surgical History:   Procedure Laterality Date    CARDIAC PACEMAKER PLACEMENT  07/26/2018    CYST REMOVAL      age 2 years old, from eyebrow area     Family History   Problem Relation Age of Onset    Cancer Mother     Stroke Mother     Heart failure Mother     Hyperlipidemia Father     Skin cancer Father    There is no direct family history of congenital heart disease, sudden death, arrythmia, hypertension, myocardial infarction, diabetes, or other inheritable disorders.    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       Interval Hospitalizations/Procedures:  none    Review of Systems   A comprehensive review of symptoms was completed and negative except as noted above.    OBJECTIVE:  Vital signs  Vitals:    07/10/23 1052   BP: (!) 112/57   BP Location: Right arm   Patient Position: Lying   BP Method: X-Large (Automatic)   Pulse: 87   Resp: (!) 30   Weight: 103.2 kg (227 lb 9.6 oz)   Height: 5' 7.76" (1.721 m)      Body mass index is 34.86 kg/m².    Physical Exam  Vitals reviewed. "   Constitutional:       General: She is not in acute distress.     Appearance: Normal appearance. She is obese. She is not ill-appearing, toxic-appearing or diaphoretic.   HENT:      Head: Normocephalic and atraumatic.      Nose: Nose normal.      Mouth/Throat:      Mouth: Mucous membranes are moist.   Cardiovascular:      Rate and Rhythm: Normal rate and regular rhythm.      Pulses: Normal pulses.           Radial pulses are 2+ on the right side.        Femoral pulses are 2+ on the right side.     Heart sounds: S1 normal. Murmur heard.      No friction rub. No gallop.      Comments: S2 mildly increased in intensity, single  Pulmonary:      Effort: Pulmonary effort is normal.      Breath sounds: Normal breath sounds.   Abdominal:      Palpations: Abdomen is soft.   Musculoskeletal:      Cervical back: Neck supple.      Right lower leg: No edema.      Left lower leg: No edema.   Skin:     General: Skin is warm and dry.      Capillary Refill: Capillary refill takes less than 2 seconds.   Neurological:      General: No focal deficit present.      Mental Status: She is alert.   Psychiatric:         Mood and Affect: Mood normal.        Electrocardiogram:  Normal sinus rhythm with atrial sensing and ventricular pacing    Echocardiogram:  l-transposition of the great arteries (congenitally corrected transposition).   No septal defects present   Mild tricuspid insuffiency (systemic AVV).   Moderate to severe right ventricular enlargement (systemic ventricle).   Mildly depressed right ventricular contractility (systemic ventricle).   No pericardial effusion        Brooks Dumont MD  [facility]  [facility]  [address] [address]  Dept: [phone]  Dept Fax: [phone]

## 2023-08-15 ENCOUNTER — OFFICE VISIT (OUTPATIENT)
Dept: PEDIATRIC CARDIOLOGY | Facility: CLINIC | Age: 43
End: 2023-08-15
Payer: COMMERCIAL

## 2023-08-15 VITALS
RESPIRATION RATE: 20 BRPM | SYSTOLIC BLOOD PRESSURE: 105 MMHG | HEIGHT: 68 IN | OXYGEN SATURATION: 95 % | DIASTOLIC BLOOD PRESSURE: 60 MMHG | HEART RATE: 73 BPM | WEIGHT: 230.19 LBS | BODY MASS INDEX: 34.89 KG/M2

## 2023-08-15 DIAGNOSIS — Q20.5 CONGENITALLY CORRECTED TRANSPOSITION OF GREAT ARTERIES: ICD-10-CM

## 2023-08-15 DIAGNOSIS — I50.22 CHRONIC SYSTOLIC CONGESTIVE HEART FAILURE: ICD-10-CM

## 2023-08-15 DIAGNOSIS — I48.0 PAROXYSMAL ATRIAL FIBRILLATION: ICD-10-CM

## 2023-08-15 DIAGNOSIS — I44.2 COMPLETE HEART BLOCK: ICD-10-CM

## 2023-08-15 DIAGNOSIS — Q24.9 ADULT CONGENITAL HEART DISEASE: Primary | ICD-10-CM

## 2023-08-15 DIAGNOSIS — I36.1 NON-RHEUMATIC TRICUSPID VALVE INSUFFICIENCY: ICD-10-CM

## 2023-08-15 PROCEDURE — 1160F PR REVIEW ALL MEDS BY PRESCRIBER/CLIN PHARMACIST DOCUMENTED: ICD-10-PCS | Mod: CPTII,S$GLB,, | Performed by: PEDIATRICS

## 2023-08-15 PROCEDURE — 3074F SYST BP LT 130 MM HG: CPT | Mod: CPTII,S$GLB,, | Performed by: PEDIATRICS

## 2023-08-15 PROCEDURE — 3078F DIAST BP <80 MM HG: CPT | Mod: CPTII,S$GLB,, | Performed by: PEDIATRICS

## 2023-08-15 PROCEDURE — 99214 OFFICE O/P EST MOD 30 MIN: CPT | Mod: 25,S$GLB,, | Performed by: PEDIATRICS

## 2023-08-15 PROCEDURE — 3074F PR MOST RECENT SYSTOLIC BLOOD PRESSURE < 130 MM HG: ICD-10-PCS | Mod: CPTII,S$GLB,, | Performed by: PEDIATRICS

## 2023-08-15 PROCEDURE — 3008F PR BODY MASS INDEX (BMI) DOCUMENTED: ICD-10-PCS | Mod: CPTII,S$GLB,, | Performed by: PEDIATRICS

## 2023-08-15 PROCEDURE — 93000 ELECTROCARDIOGRAM COMPLETE: CPT | Mod: S$GLB,,, | Performed by: PEDIATRICS

## 2023-08-15 PROCEDURE — 3078F PR MOST RECENT DIASTOLIC BLOOD PRESSURE < 80 MM HG: ICD-10-PCS | Mod: CPTII,S$GLB,, | Performed by: PEDIATRICS

## 2023-08-15 PROCEDURE — 1159F PR MEDICATION LIST DOCUMENTED IN MEDICAL RECORD: ICD-10-PCS | Mod: CPTII,S$GLB,, | Performed by: PEDIATRICS

## 2023-08-15 PROCEDURE — 3008F BODY MASS INDEX DOCD: CPT | Mod: CPTII,S$GLB,, | Performed by: PEDIATRICS

## 2023-08-15 PROCEDURE — 99214 PR OFFICE/OUTPT VISIT, EST, LEVL IV, 30-39 MIN: ICD-10-PCS | Mod: 25,S$GLB,, | Performed by: PEDIATRICS

## 2023-08-15 PROCEDURE — 93000 PR ELECTROCARDIOGRAM, COMPLETE: ICD-10-PCS | Mod: S$GLB,,, | Performed by: PEDIATRICS

## 2023-08-15 PROCEDURE — 1160F RVW MEDS BY RX/DR IN RCRD: CPT | Mod: CPTII,S$GLB,, | Performed by: PEDIATRICS

## 2023-08-15 PROCEDURE — 1159F MED LIST DOCD IN RCRD: CPT | Mod: CPTII,S$GLB,, | Performed by: PEDIATRICS

## 2023-08-15 RX ORDER — FUROSEMIDE 40 MG/1
40 TABLET ORAL DAILY
Qty: 90 TABLET | Refills: 3 | Status: SHIPPED | OUTPATIENT
Start: 2023-08-15 | End: 2024-01-18

## 2023-08-15 NOTE — PROGRESS NOTES
Thank you for referring your patient Siria Villegas to the Pediatric Cardiology clinic for consultation. Please review my findings below and feel free to contact for me for any questions or concerns.    Siria Villegas is a 42 y.o. female seen in clinic today accompanied by her fatherfor Adult congenital heart disease    ASSESSMENT/PLAN:  1. Adult congenital heart disease    2. Congenitally corrected transposition of great arteries  Assessment & Plan:  In summary, Siria has l-Transposition of the great arteries and associated complete heart block status post pacemaker placement as a young adult. Her systemic right ventricular function has gradually declined over the years and is now mildly to moderately depressed.  She has recently had an acute change in her symptoms with the development of pulmonary edema and increased fatigue.  She resnded well to diuresis in the hospital the past couple of days.  She feels better today but is certainly not back her usual baseline state.  Her pacemaker is reportedly functioning appropriately and she has had no recent arrhythmias.  I increased her Lasix to 40 mg once daily today.  I am waiting to hear back from Dr. Pacheco, but would like to increase the digoxin back to 250 mcg once daily as well.  She should return in 1 month for follow-up.  I am also going to refer Erlin to Dr. Devin Bernardo, my adult congenital heart disease colleague. There is no need for activity restrictions from a cardiovascular standpoint except for pacemaker precautions.  Please call me with any questions regarding this patient.    Orders:  -     furosemide (LASIX) 40 MG tablet; Take 1 tablet (40 mg total) by mouth once daily.  Dispense: 90 tablet; Refill: 3  -     Ambulatory referral/consult to Adult Congenital Cardiology Clinic; Future; Expected date: 08/22/2023    3. Non-rheumatic tricuspid valve insufficiency    4. Complete heart block  Overview:  Followed by Dr. Dmitriy Pacheco      5. Paroxysmal  atrial fibrillation  Overview:  Followed by Dr. Dmitriy Pacheco      6. Chronic systolic congestive heart failure  Assessment & Plan:  Reviewed hospital notes, labs, and CXR today    Increased Lasix to 40 mg once daily today.        Preventive Medicine:  SBE prophylaxis - Indicated for potentially bacteremic situations  Exercise - Limit at discretion of patient    Follow Up:  Follow up in about 1 month (around 9/15/2023) for Recheck with VS and weight.    SUBJECTIVE:  HPI  Siria Villegas is a 42 y.o. whom we follow with l-Transposition of the great arteries and complete heart block status post pacemaker placement. Her Medtronic pacemaker placement was on February 2, 2019. The patient was last seen 1 month ago and returns today for early follow up due to an ED visit.     On 8/13/23, she presented to [facility] ED with complaints of shortness of breath for a week. At [facility] urgent care on 8/1/23, she obtained a chest x-ray that demonstrated congestive heart failure. Additionally, she obtained labs for CMP, troponin, BNP, and CBC. The patient also obtained an electrocardiogram at [facility] ED which demonstrated no significant changes from her EKG on 7/7/23 and atrial-sensed ventricular-paced rhythm with prolonged AV conduction .    She continues on Digoxin 250 mcg daily, propafenone  mg 1 tab AM and 2 tabs PM, metoprolol 25 mg BID, and Lasix 20 mg daily. She reports medication compliance with her last dose of each (except Lasix) taken this morning. He last dose of lasix was taken yesterday at the hospital. She moniters her pulse ox at home with average readings of low to mid 90s%. Her most recent pacemaker interrogation was on 7/12/23 of this year and has the results with her in clinic. Complaints include shortness of breath, dizziness, tachycardia, exercise intolerance, and tiring easily. She reports having trouble completing household tasks such as laundry because she will get tired very quickly and out of  breath very quickly. The dizziness and tachycardia occur when she is walking around or doing a task. It does not occur often at rest. There are no complaints of chest pain, palpitations, decreased activity, syncope, documented arrhythmias, or headaches.    Review of patient's allergies indicates:   Allergen Reactions    Penicillins Hives     As a child.       Current Outpatient Medications:     aspirin (ECOTRIN) 81 MG EC tablet, Take 81 mg by mouth once daily. , Disp: , Rfl:     digoxin (LANOXIN) 250 mcg tablet, Take 0.5 tablets (125 mcg total) by mouth once daily., Disp: 45 tablet, Rfl: 3    metoprolol succinate (TOPROL-XL) 25 MG 24 hr tablet, Take 25 mg by mouth 2 (two) times daily. , Disp: , Rfl:     propafenone (RHTHYMOL) 150 MG Tab, Take 150 mg by mouth 2 (two) times daily. One tablet in the morning and two at night, Disp: , Rfl:     sertraline (ZOLOFT) 25 MG tablet, Take 25 mg by mouth once daily., Disp: , Rfl:     TRI-SPRINTEC, 28, 0.18/0.215/0.25 mg-35 mcg (28) tablet, Take 1 tablet by mouth once daily. , Disp: , Rfl:     furosemide (LASIX) 40 MG tablet, Take 1 tablet (40 mg total) by mouth once daily., Disp: 90 tablet, Rfl: 3  No current facility-administered medications for this visit.  Past Medical History:   Diagnosis Date    Asthma     Congestive heart failure       Past Surgical History:   Procedure Laterality Date    CARDIAC PACEMAKER PLACEMENT  07/26/2018    CYST REMOVAL      age 2 years old, from eyebrow area     Family History   Problem Relation Age of Onset    Cancer Mother     Stroke Mother     Heart failure Mother     Hyperlipidemia Father     Skin cancer Father       There is no direct family history of congenital heart disease, sudden death, arrythmia, hypertension, myocardial infarction, diabetes, or other inheritable disorders.  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       Interval Hospitalizations/Procedures:  none    Review of Systems   A comprehensive review of symptoms was completed and negative except as noted above.    OBJECTIVE:  Vital signs  Vitals:    08/15/23 0934   BP: 105/60   BP Location: Right arm   Patient Position: Lying   BP Method: Large (Automatic)   Pulse: 73   Resp: 20   SpO2: 95%   Weight: 104.4 kg (230 lb 3.2 oz)   Height: 5' 7.5" (1.715 m)      Body mass index is 35.52 kg/m².    Physical Exam  Vitals reviewed.   Constitutional:       General: She is not in acute distress.     Appearance: Normal appearance. She is obese. She is not ill-appearing, toxic-appearing or diaphoretic.   HENT:      Head: Normocephalic and atraumatic.      Mouth/Throat:      Mouth: Mucous membranes are moist.   Cardiovascular:      Rate and Rhythm: Normal rate and regular rhythm.      Pulses: Normal pulses.           Radial pulses are 2+ on the right side.        Femoral pulses are 2+ on the right side.     Heart sounds: Normal heart sounds, S1 normal and S2 normal. No murmur heard.     No friction rub. No gallop.   Pulmonary:      Effort: Pulmonary effort is normal.      Breath sounds: Normal breath sounds.   Abdominal:      General: There is distension.      Palpations: Abdomen is soft.      Tenderness: There is no abdominal tenderness.   Musculoskeletal:      Right lower leg: No edema.      Left lower leg: No edema.   Skin:     General: Skin is warm and dry.      Capillary Refill: Capillary refill takes less than 2 seconds.   Neurological:      General: No focal deficit present.      Mental Status: She is alert.   Psychiatric:         Mood and Affect: Mood normal.          Electrocardiogram:  A sensing and V pacing    Echocardiogram:  not performed today        Brooks Dumont MD  [facility] - " [address] [address]  Dept: [phone]  Dept Fax: [phone]

## 2023-08-15 NOTE — ASSESSMENT & PLAN NOTE
In summary, Siria has l-Transposition of the great arteries and associated complete heart block status post pacemaker placement as a young adult. Her systemic right ventricular function has gradually declined over the years and is now mildly to moderately depressed.  She has recently had an acute change in her symptoms with the development of pulmonary edema and increased fatigue.  She resnded well to diuresis in the hospital the past couple of days.  She feels better today but is certainly not back her usual baseline state.  Her pacemaker is reportedly functioning appropriately and she has had no recent arrhythmias.  I increased her Lasix to 40 mg once daily today.  I am waiting to hear back from Dr. Pacheco, but would like to increase the digoxin back to 250 mcg once daily as well.  She should return in 1 month for follow-up.  I am also going to refer Erlin to Dr. Devin Bernardo, my adult congenital heart disease colleague. There is no need for activity restrictions from a cardiovascular standpoint except for pacemaker precautions.  Please call me with any questions regarding this patient.

## 2023-08-23 ENCOUNTER — PATIENT MESSAGE (OUTPATIENT)
Dept: CARDIOLOGY | Facility: CLINIC | Age: 43
End: 2023-08-23
Payer: COMMERCIAL

## 2023-08-23 DIAGNOSIS — I44.2 COMPLETE HEART BLOCK: ICD-10-CM

## 2023-08-23 DIAGNOSIS — I36.1 NON-RHEUMATIC TRICUSPID VALVE INSUFFICIENCY: ICD-10-CM

## 2023-08-23 DIAGNOSIS — Q24.9 ADULT CONGENITAL HEART DISEASE: Primary | ICD-10-CM

## 2023-08-23 DIAGNOSIS — Q20.5 CONGENITALLY CORRECTED TRANSPOSITION OF GREAT ARTERIES: ICD-10-CM

## 2023-10-13 DIAGNOSIS — Q24.9 ADULT CONGENITAL HEART DISEASE: Primary | ICD-10-CM

## 2023-10-13 DIAGNOSIS — I44.2 COMPLETE HEART BLOCK: ICD-10-CM

## 2023-10-13 DIAGNOSIS — I48.0 PAROXYSMAL ATRIAL FIBRILLATION: ICD-10-CM

## 2023-10-19 ENCOUNTER — OFFICE VISIT (OUTPATIENT)
Dept: CARDIOLOGY | Facility: CLINIC | Age: 43
End: 2023-10-19
Payer: COMMERCIAL

## 2023-10-19 ENCOUNTER — PATIENT MESSAGE (OUTPATIENT)
Dept: CARDIOLOGY | Facility: CLINIC | Age: 43
End: 2023-10-19

## 2023-10-19 ENCOUNTER — CLINICAL SUPPORT (OUTPATIENT)
Dept: CARDIOLOGY | Facility: CLINIC | Age: 43
End: 2023-10-19
Attending: PEDIATRICS
Payer: COMMERCIAL

## 2023-10-19 ENCOUNTER — LAB VISIT (OUTPATIENT)
Dept: LAB | Facility: HOSPITAL | Age: 43
End: 2023-10-19
Payer: COMMERCIAL

## 2023-10-19 ENCOUNTER — HOSPITAL ENCOUNTER (OUTPATIENT)
Dept: CARDIOLOGY | Facility: CLINIC | Age: 43
Discharge: HOME | End: 2023-10-19
Payer: COMMERCIAL

## 2023-10-19 VITALS
OXYGEN SATURATION: 96 % | HEIGHT: 68 IN | BODY MASS INDEX: 33.71 KG/M2 | WEIGHT: 222.44 LBS | SYSTOLIC BLOOD PRESSURE: 126 MMHG | DIASTOLIC BLOOD PRESSURE: 56 MMHG | HEART RATE: 82 BPM

## 2023-10-19 VITALS
HEART RATE: 82 BPM | HEIGHT: 68 IN | OXYGEN SATURATION: 96 % | SYSTOLIC BLOOD PRESSURE: 126 MMHG | DIASTOLIC BLOOD PRESSURE: 56 MMHG | BODY MASS INDEX: 33.71 KG/M2 | WEIGHT: 222.44 LBS

## 2023-10-19 DIAGNOSIS — Q24.9 ADULT CONGENITAL HEART DISEASE: ICD-10-CM

## 2023-10-19 DIAGNOSIS — I50.22 CHRONIC SYSTOLIC CONGESTIVE HEART FAILURE: ICD-10-CM

## 2023-10-19 DIAGNOSIS — I44.2 COMPLETE HEART BLOCK: Primary | ICD-10-CM

## 2023-10-19 DIAGNOSIS — I44.2 COMPLETE HEART BLOCK: ICD-10-CM

## 2023-10-19 DIAGNOSIS — Q20.5 CONGENITALLY CORRECTED TRANSPOSITION OF GREAT ARTERIES: ICD-10-CM

## 2023-10-19 DIAGNOSIS — Q24.9 ADULT CONGENITAL HEART DISEASE: Primary | ICD-10-CM

## 2023-10-19 DIAGNOSIS — I36.1 NON-RHEUMATIC TRICUSPID VALVE INSUFFICIENCY: ICD-10-CM

## 2023-10-19 DIAGNOSIS — I48.0 PAROXYSMAL ATRIAL FIBRILLATION: ICD-10-CM

## 2023-10-19 LAB
ALBUMIN SERPL BCP-MCNC: 3.5 G/DL (ref 3.5–5.2)
ALP SERPL-CCNC: 95 U/L (ref 55–135)
ALT SERPL W/O P-5'-P-CCNC: 15 U/L (ref 10–44)
ANION GAP SERPL CALC-SCNC: 12 MMOL/L (ref 8–16)
AST SERPL-CCNC: 25 U/L (ref 10–40)
BILIRUB SERPL-MCNC: 0.5 MG/DL (ref 0.1–1)
BNP SERPL-MCNC: 204 PG/ML (ref 0–99)
BUN SERPL-MCNC: 12 MG/DL (ref 6–20)
CALCIUM SERPL-MCNC: 9.4 MG/DL (ref 8.7–10.5)
CHLORIDE SERPL-SCNC: 103 MMOL/L (ref 95–110)
CO2 SERPL-SCNC: 24 MMOL/L (ref 23–29)
CREAT SERPL-MCNC: 0.7 MG/DL (ref 0.5–1.4)
EST. GFR  (NO RACE VARIABLE): >60 ML/MIN/1.73 M^2
GLUCOSE SERPL-MCNC: 84 MG/DL (ref 70–110)
MAGNESIUM SERPL-MCNC: 1.9 MG/DL (ref 1.6–2.6)
POTASSIUM SERPL-SCNC: 4 MMOL/L (ref 3.5–5.1)
PROT SERPL-MCNC: 7.6 G/DL (ref 6–8.4)
SODIUM SERPL-SCNC: 139 MMOL/L (ref 136–145)

## 2023-10-19 PROCEDURE — 93010 ELECTROCARDIOGRAM REPORT: CPT | Mod: S$GLB,,, | Performed by: INTERNAL MEDICINE

## 2023-10-19 PROCEDURE — 99999 PR PBB SHADOW E&M-EST. PATIENT-LVL III: CPT | Mod: PBBFAC,,, | Performed by: PEDIATRICS

## 2023-10-19 PROCEDURE — 83880 ASSAY OF NATRIURETIC PEPTIDE: CPT | Performed by: PEDIATRICS

## 2023-10-19 PROCEDURE — 99999 PR PBB SHADOW E&M-EST. PATIENT-LVL I: ICD-10-PCS | Mod: PBBFAC,,,

## 2023-10-19 PROCEDURE — 3008F PR BODY MASS INDEX (BMI) DOCUMENTED: ICD-10-PCS | Mod: CPTII,S$GLB,, | Performed by: PEDIATRICS

## 2023-10-19 PROCEDURE — 3008F BODY MASS INDEX DOCD: CPT | Mod: CPTII,S$GLB,, | Performed by: PEDIATRICS

## 2023-10-19 PROCEDURE — 36415 COLL VENOUS BLD VENIPUNCTURE: CPT | Performed by: PEDIATRICS

## 2023-10-19 PROCEDURE — 1159F MED LIST DOCD IN RCRD: CPT | Mod: CPTII,S$GLB,, | Performed by: PEDIATRICS

## 2023-10-19 PROCEDURE — 93005 EKG 12-LEAD: ICD-10-PCS | Mod: S$GLB,,, | Performed by: PEDIATRICS

## 2023-10-19 PROCEDURE — 4010F ACE/ARB THERAPY RXD/TAKEN: CPT | Mod: CPTII,S$GLB,, | Performed by: PEDIATRICS

## 2023-10-19 PROCEDURE — 3074F SYST BP LT 130 MM HG: CPT | Mod: CPTII,S$GLB,, | Performed by: PEDIATRICS

## 2023-10-19 PROCEDURE — 4010F PR ACE/ARB THEARPY RXD/TAKEN: ICD-10-PCS | Mod: CPTII,S$GLB,, | Performed by: PEDIATRICS

## 2023-10-19 PROCEDURE — 93280 CV PACEMAKER PROGRAMMING PEDIATRICS (CUPID ONLY): ICD-10-PCS | Mod: S$GLB,,, | Performed by: PEDIATRICS

## 2023-10-19 PROCEDURE — 80053 COMPREHEN METABOLIC PANEL: CPT | Performed by: PEDIATRICS

## 2023-10-19 PROCEDURE — 99999 PR PBB SHADOW E&M-EST. PATIENT-LVL I: CPT | Mod: PBBFAC,,,

## 2023-10-19 PROCEDURE — 3074F PR MOST RECENT SYSTOLIC BLOOD PRESSURE < 130 MM HG: ICD-10-PCS | Mod: CPTII,S$GLB,, | Performed by: PEDIATRICS

## 2023-10-19 PROCEDURE — 99214 OFFICE O/P EST MOD 30 MIN: CPT | Mod: S$GLB,,, | Performed by: PEDIATRICS

## 2023-10-19 PROCEDURE — 1159F PR MEDICATION LIST DOCUMENTED IN MEDICAL RECORD: ICD-10-PCS | Mod: CPTII,S$GLB,, | Performed by: PEDIATRICS

## 2023-10-19 PROCEDURE — 99215 PR OFFICE/OUTPT VISIT, EST, LEVL V, 40-54 MIN: ICD-10-PCS | Mod: 25,S$GLB,, | Performed by: PEDIATRICS

## 2023-10-19 PROCEDURE — 99999 PR PBB SHADOW E&M-EST. PATIENT-LVL III: ICD-10-PCS | Mod: PBBFAC,,, | Performed by: PEDIATRICS

## 2023-10-19 PROCEDURE — 3078F PR MOST RECENT DIASTOLIC BLOOD PRESSURE < 80 MM HG: ICD-10-PCS | Mod: CPTII,S$GLB,, | Performed by: PEDIATRICS

## 2023-10-19 PROCEDURE — 93010 EKG 12-LEAD: ICD-10-PCS | Mod: S$GLB,,, | Performed by: INTERNAL MEDICINE

## 2023-10-19 PROCEDURE — 99214 PR OFFICE/OUTPT VISIT, EST, LEVL IV, 30-39 MIN: ICD-10-PCS | Mod: S$GLB,,, | Performed by: PEDIATRICS

## 2023-10-19 PROCEDURE — 3078F DIAST BP <80 MM HG: CPT | Mod: CPTII,S$GLB,, | Performed by: PEDIATRICS

## 2023-10-19 PROCEDURE — 93280 PM DEVICE PROGR EVAL DUAL: CPT | Mod: S$GLB,,, | Performed by: PEDIATRICS

## 2023-10-19 PROCEDURE — 93005 ELECTROCARDIOGRAM TRACING: CPT | Mod: S$GLB,,, | Performed by: PEDIATRICS

## 2023-10-19 PROCEDURE — 83735 ASSAY OF MAGNESIUM: CPT | Performed by: PEDIATRICS

## 2023-10-19 PROCEDURE — 99215 OFFICE O/P EST HI 40 MIN: CPT | Mod: 25,S$GLB,, | Performed by: PEDIATRICS

## 2023-10-19 RX ORDER — SACUBITRIL AND VALSARTAN 24; 26 MG/1; MG/1
1 TABLET, FILM COATED ORAL 2 TIMES DAILY
Qty: 60 TABLET | Refills: 11 | Status: SHIPPED | OUTPATIENT
Start: 2023-10-19 | End: 2023-11-22

## 2023-10-19 RX ORDER — CETIRIZINE HYDROCHLORIDE 10 MG/1
1 TABLET ORAL EVERY MORNING
COMMUNITY

## 2023-10-19 RX ORDER — SERTRALINE HYDROCHLORIDE 100 MG/1
100 TABLET, FILM COATED ORAL
COMMUNITY
Start: 2023-09-30

## 2023-10-19 RX ORDER — FERROUS GLUCONATE 324(38)MG
324 TABLET ORAL
COMMUNITY

## 2023-10-19 NOTE — PROGRESS NOTES
Name: Siria Villegas  MRN: [id number]  : 1980        Subjective:   CC: CC-TGA    HPI:    Siria Villegas is a 42 y.o. 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 She was referred by Dr. Dumont to Dr. Bernardo and myself.   She is followed by Dr. Dumont.  She was diagnosed with L-TGA at about 7 years of age.  Her only cardiac interventions have been pacemakers.  The 1st was placed when she was 8.  She was admitted in 2023, with likely acute on chronic heart failure.  She improved on IV Lasix.  For the 1st couple of weeks, she felt much better although she does feel like she may be getting worse again.  Her weight has not changed.  She gets short of breath with exertion.  Some exertional chest tightness as well.  She has occasional palpitations and dizziness.  No syncope.  No swelling.       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    Some nonsustained ventricular tachycardia noted on pacemaker evaluation today.  Asthma    Family Hx:  No known family history of congenital heart defects or cardiac surgeries in childhood.  No known family members with pacemakers or defibrillators.  No known inherited channelopathies or cardiomyopathies.  No known hx of sudden cardiac death or heart transplant.  No No known heart attack in someone less than 50yoa.    Social Hx:  Lives in [place], [place].      Review of Systems:  GEN:  No fevers, + fatigue,  No weight-loss, No weight-gain  EYE:  No significant changes in vision, No eye redness, No lens dislocation  ENT: No cough, No congestion, No swelling, + snoring, No hearing loss,   RESP: No increased work of breathing, + dyspnea on exertion, No noisy breathing, No hx of pneumothorax  CV:  + chest pain, + palpitations, + activity or exercise intolerance  GI:  No abdominal pain, No nausea, No vomiting, No diarrhea, + constipation  ABUNDIO: Normal UOP  MSK: No pain, No swelling, No joint dislocations, No scoliosis, No extremity swelling  HEME: No easy bruising or bleeding  NEUR: No history of seizures, No dizziness, No near-syncope, No syncope  DERM: No Rashes  PSY: No anxiety, + depression  ALL: See below.    Medications & Allergy:  Current Outpatient Medications on File Prior to Visit   Medication Sig Dispense Refill    ALBUTEROL SULFATE INHL albuterol sulfate Take No date recorded No form recorded No frequency recorded No route recorded No set duration recorded No set duration amount recorded active No dosage strength recorded No dosage strength units of measure recorded      aspirin (ECOTRIN) 81 MG EC tablet Take 81 mg by mouth once daily.       cetirizine (ZYRTEC) 10 MG tablet Take 1 tablet by mouth every morning.      digoxin (LANOXIN) 250 mcg tablet Take 0.5 tablets (125 mcg total) by mouth once daily. 45 tablet 3    ferrous gluconate (FERGON) 324 MG tablet Take 324 mg by mouth.      fluticasone propionate (FLONASE ALLERGY RELIEF NASL) Flonase Allergy Relief Take No date recorded No form recorded No frequency recorded No route recorded No set duration recorded No set duration amount recorded active No dosage strength recorded No dosage strength units of measure recorded      furosemide (LASIX) 40 MG tablet Take 1 tablet (40 mg total) by mouth once daily. 90 tablet 3    metoprolol succinate (TOPROL-XL) 25 MG 24 hr tablet Take 25 mg by mouth 2 (two) times daily.       propafenone (RHTHYMOL) 150 MG Tab Take 150 mg by  "mouth 2 (two) times daily. One tablet in the morning and two at night      sertraline (ZOLOFT) 100 MG tablet Take 100 mg by mouth.      TRI-SPRINTEC, 28, 0.18/0.215/0.25 mg-35 mcg (28) tablet Take 1 tablet by mouth once daily.       guaifenesin/phenylephrine HCl (MUCINEX COLD ORAL) Mucinex Take No date recorded No form recorded No frequency recorded No route recorded No set duration recorded No set duration amount recorded suspended No dosage strength recorded No dosage strength units of measure recorded       No current facility-administered medications on file prior to visit.       Review of patient's allergies indicates:   Allergen Reactions    Penicillins Hives     As a child.          Objective:   Vitals:  Vitals:    10/19/23 1038   BP: (!) 126/56   BP Location: Left arm   Patient Position: Sitting   Pulse: 82   SpO2: 96%   Weight: 100.9 kg (222 lb 7.1 oz)   Height: 5' 7.52" (1.715 m)     Body mass index is 34.31 kg/m².  Body surface area is 2.19 meters squared.    Exam:  GEN: No acute distress, Normal appearing  EYE: Anicteric sclerae  ENT: No drainage, Moist mucous membranes  PULM: Normal work of breathing;  Clear to auscultation bilaterally, Good air movement throughout.  CV: No chest pain;   Nml S1, single S2,  No murmurs;   No rubs or gallops;  EXT: No cyanosis, No edema   2+ radial and dorsalis pedis pulses bilaterally  ABD: Soft, Non-distended, Non-tender,    No hepatomegaly;  Normal bowel sounds  DERM: No rashes  NEUR: Normal gait, Grossly normal tone.  PSY: Normal mood and affect      Results / Data:   ECG:   (10/19/2023) - Atrioventricular paced rhythm. QRSD 212ms  (10/18/2018) - Sinus rhythm with AS-.    Echocardiogram:   (07/10/2023)  l-transposition of the great arteries (congenitally corrected transposition).   No septal defects present Mild tricuspid insuffiency (systemic AVV).   Moderate to severe right ventricular enlargement (systemic ventricle).   Mildly depressed right ventricular " contractility (systemic ventricle).   No pericardial effusion.     Device Interrogation:  (10/19/2023)    RA Lead: P/R-wave: 3.9 mV Lead Impedance: 342 Ohms Paced: 12% RV Lead: P/R-wave: (none)Paced. mV Impedance: 399 Ohms Paced: 100%    Mode: DDDR Lower limit rate: 60 bpm Upper tracking rate: 130 bpm Max sensor rate: 130 bpm    General comments: Device interrogation and lead testing performed. Device and leads WNL. No new Arrhythmias noted. 4 NS-VT episodes noted on a prior remote transmission by her usual Provider. 1 in June 2023 and 3 in September 2023. Presenting Rhythm AS/, occasional AP noted. Patient symptomatic when checking underlying ventricular rhythm. No R wave noted at VVI 40.    Reprogramming comments: No changes this session      Assessment / Plan:   Siria Villegas is a 42 y.o. 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 She was referred by Dr. Dumont to Dr. Bernardo and myself.    I'm in agreement with Dr. Bernardo that starting Entresto as wise in her situation.  I also think there could be benefit in adding a coronary sinus/LV pacing lead as her ECG shows a left bundle pattern with a particularly wide paced QRS complex.  Placing this particular lead can be challenging given a congenital dysmorphology, though outcomes overall optimistic, even if limited.      Follow-up:   Virtual visit with Dr. Bernardo in 1 month.  Will discuss timing of CRT.  Cardiac medications:    Entresto  Lasix  Digoxin  Metoprolol XL    Please contact us if he has any questions or concerns.  Our clinic from his [phone] during office hours. For urgent night and weekend concerns, call [phone] and ask for the pediatric cardiologist on call to be paged.

## 2023-10-19 NOTE — PROGRESS NOTES
10/19/2023    re:Siria Villegas  :1980     Nick Elaine MD  [facility] [address]    Dr. Matt Dumont    [facility]    Siria Villegas is a 42 y.o. female is seen today for an initial consultation in my [facility] at the request of Dr. Matt Dumont, her primary cardiologist in [place].  To summarize, her diagnoses are as follows:  1.  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  Reduced systemic right ventricular function with worsening heart failure symptoms  3.  Normal appearing tricuspid valve with only mild tricuspid insufficiency  4.  History of supraventricular arrhythmias.  Some nonsustained ventricular tachycardia noted on pacemaker evaluation today.  5.  Complete heart block with transvenous pacemaker - wide complex     My recommendations are as follows:  1.  Start entresto low dose.  Long term plan to add aldactone and possibly Farxiga.  2.  Check labs today  3.  virtual visit in 1 month - likely go up on Entresto dose at that time  4.  EP team to investigate CRT    Discussion:  She has symptomatic heart failure with a systemic right ventricle and some echocardiographic evidence of worsening right ventricular function although on my review of her most recent echocardiogram, I did not think the function was severely reduced and her tricuspid insufficiency was only mild.  We had a long discussion.  Progressive systemic right ventricular failure is common in these patients.  There is some emerging data that goal-directed medical therapy can be helpful with the systemic right ventricles, and I am starting some Entresto.  She has a very wide QRS on her EKG, and she is paced.  There may be some utility to resynchronization as well.  She was seen by our EP team today, and they are going to further investigate.  Finally, we did discuss the  potential need to evaluate for heart transplant.  I am going to see how she responds to therapy.    History of present illness:  She is followed by Dr. Dumont.  She was diagnosed with L-TGA at about 7 years of age.  Her only cardiac interventions have been pacemakers.  The 1st was placed when she was 8.      She was admitted in August with likely acute on chronic heart failure.  She improved on IV Lasix.  For the 1st couple of weeks, she felt much better although she does feel like she may be getting worse again.  Her weight has not changed.  She gets short of breath with exertion.  Some exertional chest tightness as well.  She has occasional palpitations and dizziness.  No syncope.  No swelling.    She works as a teacher.    The review of systems is as noted above. It is otherwise negative for other symptoms related to the general, neurological, psychiatric, endocrine, gastrointestinal, genitourinary, respiratory, dermatologic, musculoskeletal, hematologic, and immunologic systems.    Past Medical History:   Diagnosis Date    Asthma     Congestive heart failure      Past Surgical History:   Procedure Laterality Date    CARDIAC PACEMAKER PLACEMENT  07/26/2018    CYST REMOVAL      age 2 years old, from eyebrow area     Family History   Problem Relation Age of Onset    Cancer Mother     Stroke Mother     Heart failure Mother     Hyperlipidemia Father     Skin cancer Father      Social History     Socioeconomic History    Marital status: Single   Tobacco Use    Smoking status: Never    Smokeless tobacco: Never   Substance and Sexual Activity    Alcohol use: No    Drug use: No   Social History Narrative    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  No children.     Current Outpatient Medications on File Prior to Visit   Medication Sig Dispense Refill    aspirin (ECOTRIN) 81 MG EC tablet Take 81 mg by mouth once daily.       digoxin (LANOXIN) 250 mcg tablet Take 0.5 tablets (125 mcg total) by mouth once  "daily. 45 tablet 3    furosemide (LASIX) 40 MG tablet Take 1 tablet (40 mg total) by mouth once daily. 90 tablet 3    metoprolol succinate (TOPROL-XL) 25 MG 24 hr tablet Take 25 mg by mouth 2 (two) times daily.       propafenone (RHTHYMOL) 150 MG Tab Take 150 mg by mouth 2 (two) times daily. One tablet in the morning and two at night      sertraline (ZOLOFT) 25 MG tablet Take 25 mg by mouth once daily.      TRI-SPRINTEC, 28, 0.18/0.215/0.25 mg-35 mcg (28) tablet Take 1 tablet by mouth once daily.        No current facility-administered medications on file prior to visit.     Review of patient's allergies indicates:   Allergen Reactions    Penicillins Hives     As a child.     Vitals:    10/19/23 1100   BP: (!) 126/56   Pulse: 82   SpO2: 96%   Weight: 100.9 kg (222 lb 7.1 oz)   Height: 5' 7.52" (1.715 m)     BP (!) 126/56   Pulse 82   Ht 5' 7.52" (1.715 m)   Wt 100.9 kg (222 lb 7.1 oz)   SpO2 96%   BMI 34.31 kg/m²   In general, she is an obese but otherwise very healthy-appearing nondysmorphic female in no apparent distress.  The eyes, nares, and oropharynx are clear.  Eyelids and conjunctiva are normal without drainage or erythema.  Pupils equal and round bilaterally.  The head is normocephalic and atraumatic.  The neck is supple without jugular venous distention or thyroid enlargement.  The lungs are clear to auscultation bilaterally.  There are no scars on the chest wall.  The point of maximum impulse is somewhat more midline than typical.  The 1st heart sound is normal. The 2nd heart sound is single.  I do not hear a murmur.  The pacemaker is positioned in the right upper chest.  The abdominal exam is benign without hepatosplenomegaly, tenderness, or distention.  Pulses are normal in all 4 extremities with brisk capillary refill and no clubbing, cyanosis, or edema.  No rashes are noted.    I personally reviewed the following tests performed today and my interpretation follows:  An EKG performed in clinic " today reveals atrial sensing and ventricular pacing with good capture.  QRS is very wide.    I reviewed her echo from July 2023:  l-transposition of the great arteries (congenitally corrected transposition). No septal defects present Mild tricuspid insuffiency (systemic AVV). Moderate to severe right ventricular enlargement (systemic ventricle). Mildly depressed right ventricular contractility (systemic ventricle). No pericardial effusion.     BNP   Date Value Ref Range Status   08/13/2023 729 (H) 15 - 111 PG/ML Final      CMP from 8/22/23 with K 4.5, creatinine 0.69. (Care everywhere)    Thank you for referring this patient to our clinic.  Please call with any questions.    Sincerely,        Conor Bernardo MD  Pediatric Cardiology  Adult Congenital Heart Disease  Pediatric Heart Failure and Transplantation  [facility] [address] [place]  [address]  [phone]

## 2023-10-20 LAB
AV DELAY - LONGEST: 200 MSEC
BATTERY VOLTAGE (V): 2.98 V
ERI (V): 2.63 V
IMPEDANCE RA LEAD (NATIVE): 399 OHMS
IMPEDANCE RA LEAD: 342 OHMS
OHS CV DC PP MS1: 0.4 MS
OHS CV DC PP MS2: 0.4 MS
OHS CV DC PP V1: NORMAL V
OHS CV DC PP V2: NORMAL V
P/R-WAVE RA LEAD: 3.9 MV
PV DELAY - LONGEST: 170 MSEC
THRESHOLD MS RA LEAD (NATIVE): 0.4 MS
THRESHOLD MS RA LEAD: 0.4 MS
THRESHOLD V RA LEAD (NATIVE): 0.4 V
THRESHOLD V RA LEAD: 0.5 V

## 2023-10-23 ENCOUNTER — TELEPHONE (OUTPATIENT)
Dept: PEDIATRIC CARDIOLOGY | Facility: CLINIC | Age: 43
End: 2023-10-23
Payer: COMMERCIAL

## 2023-10-23 NOTE — TELEPHONE ENCOUNTER
S/W patient. The patient was referred to cardiologists in [facility]. Patient needed to get rescheduled because she needed to be seen after Dr. Dumont, Dr. Worley, Dr. Scanlon, and Dr. Bernardo have a conference meeting regarding the patient. The patient did not know what date the conference will be. I rescheduled her for 11/02/2023 for now. Patient needs to be seen by Julia after the conference. Pt requested a callback so she knows if the conference will be before her new appt on (11/02/2023) or if she needs to reschedule to a later date.

## 2023-10-23 NOTE — TELEPHONE ENCOUNTER
Pt had visits with [facility] on 10/19.  Were they discussing further treatment?   Didn't see her on the conference list from this past Friday, but that was the day after her appts there.  They may discuss her this Friday.  Does she need to see us before or after conference discussion?

## 2023-10-24 NOTE — TELEPHONE ENCOUNTER
S/W pt, and we will keep her on the schedule for now and will move appt back and look into if pt has not been discussed by [facility] team by that point.

## 2023-10-31 NOTE — TELEPHONE ENCOUNTER
Called to confirm pt's appointment and she was wondering if the group discussion with multiple cardiologists occurred yet. Told pt to keep her appt on 11/2/23 for now and we would give her a call as soon as we receive any updates.

## 2023-11-22 ENCOUNTER — PATIENT MESSAGE (OUTPATIENT)
Dept: CARDIOLOGY | Facility: CLINIC | Age: 43
End: 2023-11-22

## 2023-11-22 ENCOUNTER — OFFICE VISIT (OUTPATIENT)
Dept: CARDIOLOGY | Facility: CLINIC | Age: 43
End: 2023-11-22
Payer: COMMERCIAL

## 2023-11-22 DIAGNOSIS — I50.22 CHRONIC SYSTOLIC CONGESTIVE HEART FAILURE: ICD-10-CM

## 2023-11-22 DIAGNOSIS — I44.2 COMPLETE HEART BLOCK: ICD-10-CM

## 2023-11-22 DIAGNOSIS — R40.0 DAYTIME SOMNOLENCE: ICD-10-CM

## 2023-11-22 DIAGNOSIS — Q20.5 CONGENITALLY CORRECTED TRANSPOSITION OF GREAT ARTERIES: Primary | ICD-10-CM

## 2023-11-22 DIAGNOSIS — Q20.5 CONGENITALLY CORRECTED TRANSPOSITION OF GREAT ARTERIES: ICD-10-CM

## 2023-11-22 DIAGNOSIS — Q24.9 ADULT CONGENITAL HEART DISEASE: Primary | ICD-10-CM

## 2023-11-22 DIAGNOSIS — I36.1 NON-RHEUMATIC TRICUSPID VALVE INSUFFICIENCY: ICD-10-CM

## 2023-11-22 DIAGNOSIS — Q24.9 ADULT CONGENITAL HEART DISEASE: ICD-10-CM

## 2023-11-22 PROCEDURE — 99213 OFFICE O/P EST LOW 20 MIN: CPT | Mod: 95,,, | Performed by: PEDIATRICS

## 2023-11-22 PROCEDURE — 99213 PR OFFICE/OUTPT VISIT, EST, LEVL III, 20-29 MIN: ICD-10-PCS | Mod: 95,,, | Performed by: PEDIATRICS

## 2023-11-22 PROCEDURE — 4010F PR ACE/ARB THEARPY RXD/TAKEN: ICD-10-PCS | Mod: CPTII,95,, | Performed by: PEDIATRICS

## 2023-11-22 PROCEDURE — 4010F ACE/ARB THERAPY RXD/TAKEN: CPT | Mod: CPTII,95,, | Performed by: PEDIATRICS

## 2023-11-22 RX ORDER — SACUBITRIL AND VALSARTAN 49; 51 MG/1; MG/1
1 TABLET, FILM COATED ORAL 2 TIMES DAILY
Qty: 60 TABLET | Refills: 11 | Status: SHIPPED | OUTPATIENT
Start: 2023-11-22

## 2023-11-22 NOTE — PROGRESS NOTES
2023    re:Siria Villegas  :1980     Nick Elaine MD  [facility] [address]    Dr. Matt Dumont    [facility]    The patient location is: home  The chief complaint leading to consultation is: congenital heart disease    Visit type: audiovisual    Face to Face time with patient: 12 min  30 minutes of total time spent on the encounter, which includes face to face time and non-face to face time preparing to see the patient (eg, review of tests), Obtaining and/or reviewing separately obtained history, Documenting clinical information in the electronic or other health record, Independently interpreting results (not separately reported) and communicating results to the patient/family/caregiver, or Care coordination (not separately reported).         Each patient to whom he or she provides medical services by telemedicine is:  (1) informed of the relationship between the physician and patient and the respective role of any other health care provider with respect to management of the patient; and (2) notified that he or she may decline to receive medical services by telemedicine and may withdraw from such care at any time.    Notes:       Siria Villegas is a 42 y.o. female is seen today for an initial consultation in my [facility] at the request of Dr. Matt Dumont, her primary cardiologist in [place].  To summarize, her diagnoses are as follows:  1.  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  Reduced systemic right ventricular function with worsening heart failure symptoms  3.  Normal appearing tricuspid valve with only mild tricuspid insufficiency  4.  History of supraventricular arrhythmias.  Some nonsustained ventricular tachycardia noted on pacemaker evaluation today.  5.  Complete heart block with transvenous pacemaker - wide complex      My recommendations are as follows:  1.  Increase Entresto to 49/51 twice a day  2.  Check labs labs in 2 weeks.  Will check CBC, CMP, BNP, TSH since we do not have a recent TSH or CBC in the system.  Will likely add Aldactone based on those results.  Would also consider adding Farxiga.  3.  I will call her after I review the blood work.  4.  EP team to investigate CRT  5.  Referral for sleep study.  6.  Will likely have her see the heart failure team in [place] as well.  7.  I would like to see her with a repeat echocardiogram around January or February 2024.  8.  I am absolutely fine with her going back to the gym.  Light weightlifting and light aerobic activity is recommended.  I would recommend against highly strenuous activities.    Discussion:  She has symptomatic heart failure with a systemic right ventricle and some echocardiographic evidence of worsening right ventricular function although on my review of her most recent echocardiogram, I did not think the function was severely reduced and her tricuspid insufficiency was only mild.  She seems to be doing better on her current medical regimen, and she has tolerated low-dose Entresto.  We had a long discussion at her last clinic visit.  Progressive systemic right ventricular failure is common in these patients.  There is some emerging data that goal-directed medical therapy can be helpful with the systemic right ventricles, and I am starting some Entresto.  She has a very wide QRS on her EKG, and she is paced.  There may be some utility to resynchronization as well.  She was seen by our EP team today, and they are going to further investigate.  Finally, we did discuss the potential need to evaluate for heart transplant.  I am going to see how she responds to therapy.    History of present illness:  She is followed by Dr. Dumont.  She was diagnosed with L-TGA at about 7 years of age.  Her only cardiac interventions have been pacemakers.  The 1st was  placed when she was 8.  She was admitted in August 2023 with likely acute on chronic heart failure.  She improved on IV Lasix.      I saw her a month ago and started Entresto.  Overall, she feels like she is doing better.  She definitely is getting more restful sleep.  When she wakes up in the morning, she is full of energy which is different than a few months ago.  After about 3 or 4 hours, she feels tired.  No shortness of breath.  No edema.  No orthopnea.  She has been working on improving her diet, and her weight is actually down a little bit.  She is interested in getting back into the gym.    She works as a teacher.    The review of systems is as noted above. It is otherwise negative for other symptoms related to the general, neurological, psychiatric, endocrine, gastrointestinal, genitourinary, respiratory, dermatologic, musculoskeletal, hematologic, and immunologic systems.    Past Medical History:   Diagnosis Date    Asthma     Congestive heart failure      Past Surgical History:   Procedure Laterality Date    CARDIAC PACEMAKER PLACEMENT  07/26/2018    CYST REMOVAL      age 2 years old, from eyebrow area     Family History   Problem Relation Age of Onset    Cancer Mother     Stroke Mother     Heart failure Mother     Hyperlipidemia Father     Skin cancer Father      Social History     Socioeconomic History    Marital status: Single   Tobacco Use    Smoking status: Never    Smokeless tobacco: Never   Substance and Sexual Activity    Alcohol use: No    Drug use: No   Social History Narrative    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  No children.     Current Outpatient Medications on File Prior to Visit   Medication Sig Dispense Refill    ALBUTEROL SULFATE INHL albuterol sulfate Take No date recorded No form recorded No frequency recorded No route recorded No set duration recorded No set duration amount recorded active No dosage strength recorded No dosage strength units of measure  recorded      aspirin (ECOTRIN) 81 MG EC tablet Take 81 mg by mouth once daily.       cetirizine (ZYRTEC) 10 MG tablet Take 1 tablet by mouth every morning.      digoxin (LANOXIN) 250 mcg tablet Take 0.5 tablets (125 mcg total) by mouth once daily. 45 tablet 3    ferrous gluconate (FERGON) 324 MG tablet Take 324 mg by mouth.      fluticasone propionate (FLONASE ALLERGY RELIEF NASL) Flonase Allergy Relief Take No date recorded No form recorded No frequency recorded No route recorded No set duration recorded No set duration amount recorded active No dosage strength recorded No dosage strength units of measure recorded      furosemide (LASIX) 40 MG tablet Take 1 tablet (40 mg total) by mouth once daily. 90 tablet 3    guaifenesin/phenylephrine HCl (MUCINEX COLD ORAL) Mucinex Take No date recorded No form recorded No frequency recorded No route recorded No set duration recorded No set duration amount recorded suspended No dosage strength recorded No dosage strength units of measure recorded      metoprolol succinate (TOPROL-XL) 25 MG 24 hr tablet Take 25 mg by mouth 2 (two) times daily.       propafenone (RHTHYMOL) 150 MG Tab Take 150 mg by mouth 2 (two) times daily. One tablet in the morning and two at night      sacubitriL-valsartan (ENTRESTO) 24-26 mg per tablet Take 1 tablet by mouth 2 (two) times daily. 60 tablet 11    sertraline (ZOLOFT) 100 MG tablet Take 100 mg by mouth.      TRI-SPRINTEC, 28, 0.18/0.215/0.25 mg-35 mcg (28) tablet Take 1 tablet by mouth once daily.        No current facility-administered medications on file prior to visit.     Review of patient's allergies indicates:   Allergen Reactions    Penicillins Hives     As a child.      10/19/2023   Vitals - 1 value per visit    SYSTOLIC 126 !    SYSTOLIC 126 !    DIASTOLIC 56 !    DIASTOLIC 56 !    Pulse 82    Pulse 82    Resp    SPO2 96 %    SPO2 96 %    Weight (lb) 222.44    Weight (lb) 222.44       Legend:  ! Abnormal  In general, she is an obese  but otherwise very healthy-appearing nondysmorphic female in no apparent distress.  No shortness of breath at all during our virtual visit.    No tests today:  An EKG performed in clinic today reveals atrial sensing and ventricular pacing with good capture.  QRS is very wide.    I reviewed her echo from July 2023:  l-transposition of the great arteries (congenitally corrected transposition). No septal defects present Mild tricuspid insuffiency (systemic AVV). Moderate to severe right ventricular enlargement (systemic ventricle). Mildly depressed right ventricular contractility (systemic ventricle). No pericardial effusion.     CMP  Sodium   Date Value Ref Range Status   10/19/2023 139 136 - 145 mmol/L Final     Potassium   Date Value Ref Range Status   10/19/2023 4.0 3.5 - 5.1 mmol/L Final     Chloride   Date Value Ref Range Status   10/19/2023 103 95 - 110 mmol/L Final     CO2   Date Value Ref Range Status   10/19/2023 24 23 - 29 mmol/L Final     Glucose   Date Value Ref Range Status   10/19/2023 84 70 - 110 mg/dL Final     BUN   Date Value Ref Range Status   10/19/2023 12 6 - 20 mg/dL Final     Creatinine   Date Value Ref Range Status   10/19/2023 0.7 0.5 - 1.4 mg/dL Final     Calcium   Date Value Ref Range Status   10/19/2023 9.4 8.7 - 10.5 mg/dL Final     Total Protein   Date Value Ref Range Status   10/19/2023 7.6 6.0 - 8.4 g/dL Final     Albumin   Date Value Ref Range Status   10/19/2023 3.5 3.5 - 5.2 g/dL Final     Total Bilirubin   Date Value Ref Range Status   10/19/2023 0.5 0.1 - 1.0 mg/dL Final     Comment:     For infants and newborns, interpretation of results should be based  on gestational age, weight and in agreement with clinical  observations.    Premature Infant recommended reference ranges:  Up to 24 hours.............<8.0 mg/dL  Up to 48 hours............<12.0 mg/dL  3-5 days..................<15.0 mg/dL  6-29 days.................<15.0 mg/dL       Alkaline Phosphatase   Date Value Ref Range  Status   10/19/2023 95 55 - 135 U/L Final     AST   Date Value Ref Range Status   10/19/2023 25 10 - 40 U/L Final     ALT   Date Value Ref Range Status   10/19/2023 15 10 - 44 U/L Final     Anion Gap   Date Value Ref Range Status   10/19/2023 12 8 - 16 mmol/L Final     eGFR   Date Value Ref Range Status   10/19/2023 >60.0 >60 mL/min/1.73 m^2 Final         BNP   Date Value Ref Range Status   10/19/2023 204 (H) 0 - 99 pg/mL Final     Comment:     Values of less than 100 pg/ml are consistent with non-CHF populations.      CMP from 8/22/23 with K 4.5, creatinine 0.69. (Care everywhere)    Thank you for referring this patient to our clinic.  Please call with any questions.    Sincerely,        Conor Bernardo MD  Pediatric Cardiology  Adult Congenital Heart Disease  Pediatric Heart Failure and Transplantation  [facility] [address] [place]  [address]  [phone]

## 2023-12-06 ENCOUNTER — LAB VISIT (OUTPATIENT)
Dept: LAB | Facility: HOSPITAL | Age: 43
End: 2023-12-06
Attending: PEDIATRICS
Payer: COMMERCIAL

## 2023-12-06 DIAGNOSIS — Q20.5 CONGENITALLY CORRECTED TRANSPOSITION OF GREAT ARTERIES: ICD-10-CM

## 2023-12-06 DIAGNOSIS — I50.22 CHRONIC SYSTOLIC CONGESTIVE HEART FAILURE: ICD-10-CM

## 2023-12-06 LAB
ALBUMIN SERPL BCP-MCNC: 3.2 G/DL (ref 3.5–5.2)
ALP SERPL-CCNC: 83 U/L (ref 55–135)
ALT SERPL W/O P-5'-P-CCNC: 9 U/L (ref 10–44)
ANION GAP SERPL CALC-SCNC: 16 MMOL/L (ref 8–16)
AST SERPL-CCNC: 13 U/L (ref 10–40)
BASOPHILS # BLD AUTO: NORMAL K/UL (ref 0–0.2)
BASOPHILS NFR BLD: 0 % (ref 0–1.9)
BILIRUB SERPL-MCNC: 0.3 MG/DL (ref 0.1–1)
BNP SERPL-MCNC: 177 PG/ML (ref 0–99)
BUN SERPL-MCNC: 16 MG/DL (ref 6–20)
CALCIUM SERPL-MCNC: 8.9 MG/DL (ref 8.7–10.5)
CHLORIDE SERPL-SCNC: 105 MMOL/L (ref 95–110)
CO2 SERPL-SCNC: 20 MMOL/L (ref 23–29)
CREAT SERPL-MCNC: 0.6 MG/DL (ref 0.5–1.4)
DIFFERENTIAL METHOD: NORMAL
EOSINOPHIL # BLD AUTO: NORMAL K/UL (ref 0–0.5)
EOSINOPHIL NFR BLD: 0 % (ref 0–8)
ERYTHROCYTE [DISTWIDTH] IN BLOOD BY AUTOMATED COUNT: 13.9 % (ref 11.5–14.5)
EST. GFR  (NO RACE VARIABLE): >60 ML/MIN/1.73 M^2
GLUCOSE SERPL-MCNC: 69 MG/DL (ref 70–110)
HCT VFR BLD AUTO: 38.4 % (ref 37–48.5)
HGB BLD-MCNC: 12.5 G/DL (ref 12–16)
HYPOCHROMIA BLD QL SMEAR: NORMAL
IMM GRANULOCYTES # BLD AUTO: NORMAL K/UL (ref 0–0.04)
IMM GRANULOCYTES NFR BLD AUTO: NORMAL % (ref 0–0.5)
LYMPHOCYTES # BLD AUTO: NORMAL K/UL (ref 1–4.8)
LYMPHOCYTES NFR BLD: 31 % (ref 18–48)
MCH RBC QN AUTO: 29.1 PG (ref 27–31)
MCHC RBC AUTO-ENTMCNC: 32.6 G/DL (ref 32–36)
MCV RBC AUTO: 90 FL (ref 82–98)
MONOCYTES # BLD AUTO: NORMAL K/UL (ref 0.3–1)
MONOCYTES NFR BLD: 6 % (ref 4–15)
NEUTROPHILS # BLD AUTO: NORMAL K/UL (ref 1.8–7.7)
NEUTROPHILS NFR BLD: 63 % (ref 38–73)
NRBC BLD-RTO: 0 /100 WBC
PLATELET # BLD AUTO: 156 K/UL (ref 150–450)
PLATELET BLD QL SMEAR: NORMAL
PMV BLD AUTO: 10.7 FL (ref 9.2–12.9)
POTASSIUM SERPL-SCNC: 4.1 MMOL/L (ref 3.5–5.1)
PROT SERPL-MCNC: 7 G/DL (ref 6–8.4)
RBC # BLD AUTO: 4.29 M/UL (ref 4–5.4)
SODIUM SERPL-SCNC: 141 MMOL/L (ref 136–145)
TARGETS BLD QL SMEAR: NORMAL
TSH SERPL DL<=0.005 MIU/L-ACNC: 2.13 UIU/ML (ref 0.4–4)
WBC # BLD AUTO: 8.19 K/UL (ref 3.9–12.7)

## 2023-12-06 PROCEDURE — 85007 BL SMEAR W/DIFF WBC COUNT: CPT | Performed by: PEDIATRICS

## 2023-12-06 PROCEDURE — 80053 COMPREHEN METABOLIC PANEL: CPT | Performed by: PEDIATRICS

## 2023-12-06 PROCEDURE — 85027 COMPLETE CBC AUTOMATED: CPT | Performed by: PEDIATRICS

## 2023-12-06 PROCEDURE — 36415 COLL VENOUS BLD VENIPUNCTURE: CPT | Performed by: PEDIATRICS

## 2023-12-06 PROCEDURE — 84443 ASSAY THYROID STIM HORMONE: CPT | Performed by: PEDIATRICS

## 2023-12-06 PROCEDURE — 83880 ASSAY OF NATRIURETIC PEPTIDE: CPT | Performed by: PEDIATRICS

## 2023-12-07 ENCOUNTER — PATIENT MESSAGE (OUTPATIENT)
Dept: CARDIOLOGY | Facility: CLINIC | Age: 43
End: 2023-12-07
Payer: COMMERCIAL

## 2023-12-07 ENCOUNTER — TELEPHONE (OUTPATIENT)
Dept: PEDIATRIC CARDIOLOGY | Facility: CLINIC | Age: 43
End: 2023-12-07
Payer: COMMERCIAL

## 2023-12-07 RX ORDER — SPIRONOLACTONE 25 MG/1
25 TABLET ORAL DAILY
Qty: 30 TABLET | Refills: 11 | Status: SHIPPED | OUTPATIENT
Start: 2023-12-07 | End: 2024-12-06

## 2023-12-07 NOTE — TELEPHONE ENCOUNTER
Labs look good.  Will add Aldactone 25 mg daily.  Awaiting discussions with the electrophysiology team regarding potential for resynchronization.  Patient tells me she has not yet heard from sleep Medicine regarding setting up a sleep study.  We will reach out to them.

## 2023-12-15 ENCOUNTER — OFFICE VISIT (OUTPATIENT)
Dept: NEUROLOGY | Facility: CLINIC | Age: 43
End: 2023-12-15
Payer: COMMERCIAL

## 2023-12-15 DIAGNOSIS — R40.0 DAYTIME SOMNOLENCE: ICD-10-CM

## 2023-12-15 DIAGNOSIS — I50.22 CHRONIC SYSTOLIC CONGESTIVE HEART FAILURE: ICD-10-CM

## 2023-12-15 DIAGNOSIS — Q20.5 CONGENITALLY CORRECTED TRANSPOSITION OF GREAT ARTERIES: ICD-10-CM

## 2023-12-15 PROCEDURE — 99499 UNLISTED E&M SERVICE: CPT | Mod: 95,,, | Performed by: PSYCHIATRY & NEUROLOGY

## 2023-12-15 PROCEDURE — 99499 NO LOS: ICD-10-PCS | Mod: 95,,, | Performed by: PSYCHIATRY & NEUROLOGY

## 2023-12-15 NOTE — PROGRESS NOTES
Subjective     Patient ID: Siria Villegas is a 43 y.o. female.    Chief Complaint: No chief complaint on file.    HPI   Pt was inappropriately scheduled for sleep evaluation in [place]  She lives in [facility]  I will refer her to gi hooker

## 2024-01-17 NOTE — PROGRESS NOTES
Name: Siria Villegas  MRN: [id number]  : 1980        Subjective:   CC: CC-TGA    HPI:    Siria Villegas is a 43 y.o. female with Congential-Corrected L-TGA with significantly reduced systemic right ventricular function; Complete AV-block s/p dual-chamber pacemaker; who presents to [facility] at [facility]. She was referred by Dr. Dumont to Dr. Bernardo and myself.   She was last seen by myself and Dr. Bernardo on 10/19/2023. She was started on Entresto at that visit.   She is also followed by Dr. Dumont.  She was diagnosed with L-TGA at about 7 years of age.  Her only cardiac interventions have been pacemakers.  The 1st was placed when she was 8.  She was admitted in 2023, with likely acute on chronic heart failure.  She improved on IV Lasix.  For the 1st couple of weeks, she felt much better although she does feel like she may be getting worse again.  Her weight has not changed.  She gets short of breath with exertion.  Some exertional chest tightness as well.  She has occasional palpitations and dizziness.  No syncope.  No swelling.       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    Some nonsustained ventricular tachycardia noted on pacemaker evaluation today.  Asthma    Family Hx:  No known family history of congenital heart defects or cardiac surgeries in childhood.  No known family members with pacemakers or defibrillators.  No known inherited channelopathies or cardiomyopathies.  No known hx of sudden cardiac death or heart transplant.  No No known heart attack in someone less than 50yoa.    Social  Hx:  Lives in [place], [place].      Review of Systems:  GEN:  No fevers, + fatigue, No weight-loss, No weight-gain  EYE:  No significant changes in vision, No eye redness, No lens dislocation  ENT: No cough, No congestion, No swelling, + snoring, No hearing loss,   RESP: No increased work of breathing, + dyspnea on exertion, No noisy breathing, No hx of pneumothorax  CV:  + chest pain, + palpitations, + activity or exercise intolerance  GI:  No abdominal pain, No nausea, No vomiting, No diarrhea, + constipation  ABUNDIO: Normal UOP  MSK: No pain, No swelling, No joint dislocations, No scoliosis, No extremity swelling  HEME: No easy bruising or bleeding  NEUR: No history of seizures, No dizziness, No near-syncope, No syncope  DERM: No Rashes  PSY: No anxiety, + depression  ALL: See below.    Medications & Allergy:  Current Outpatient Medications on File Prior to Visit   Medication Sig Dispense Refill    ALBUTEROL SULFATE INHL albuterol sulfate Take No date recorded No form recorded No frequency recorded No route recorded No set duration recorded No set duration amount recorded active No dosage strength recorded No dosage strength units of measure recorded      aspirin (ECOTRIN) 81 MG EC tablet Take 81 mg by mouth once daily.       cetirizine (ZYRTEC) 10 MG tablet Take 1 tablet by mouth every morning.      digoxin (LANOXIN) 250 mcg tablet Take 0.5 tablets (125 mcg total) by mouth once daily. 45 tablet 3    ferrous gluconate (FERGON) 324 MG tablet Take 324 mg by mouth.      fluticasone propionate (FLONASE ALLERGY RELIEF NASL) Flonase Allergy Relief Take No date recorded No form recorded No frequency recorded No route recorded No set duration recorded No set duration amount recorded active No dosage strength recorded No dosage strength units of measure recorded      guaifenesin/phenylephrine HCl (MUCINEX COLD ORAL) Mucinex Take No date recorded No form recorded No frequency recorded No route recorded  "No set duration recorded No set duration amount recorded suspended No dosage strength recorded No dosage strength units of measure recorded      propafenone (RHTHYMOL) 150 MG Tab Take 150 mg by mouth 2 (two) times daily. One tablet in the morning and two at night      sacubitriL-valsartan (ENTRESTO) 49-51 mg per tablet Take 1 tablet by mouth 2 (two) times daily. 60 tablet 11    sertraline (ZOLOFT) 100 MG tablet Take 100 mg by mouth.      spironolactone (ALDACTONE) 25 MG tablet Take 1 tablet (25 mg total) by mouth once daily. 30 tablet 11    TRI-SPRINTEC, 28, 0.18/0.215/0.25 mg-35 mcg (28) tablet Take 1 tablet by mouth once daily.       furosemide (LASIX) 40 MG tablet Take 1 tablet (40 mg total) by mouth 2 (two) times a day. 180 tablet 3     No current facility-administered medications on file prior to visit.       Review of patient's allergies indicates:   Allergen Reactions    Penicillins Hives     As a child.          Objective:   Vitals:  Vitals:    01/18/24 0900   BP: (!) 111/53   BP Location: Left arm   Patient Position: Sitting   Pulse: 82   SpO2: 97%   Weight: 104.4 kg (230 lb 2.6 oz)   Height: 5' 7.01" (1.702 m)       Body mass index is 36.04 kg/m².  Body surface area is 2.22 meters squared.    Exam:  GEN: No acute distress, Normal appearing  EYE: Anicteric sclerae  ENT: No drainage, Moist mucous membranes  PULM: Normal work of breathing;  Clear to auscultation bilaterally, Good air movement throughout.  CV: No chest pain;   Nml S1, single S2,  No murmurs;   No rubs or gallops;  EXT: No cyanosis, No edema   2+ radial and dorsalis pedis pulses bilaterally  ABD: Soft, Non-distended, Non-tender,    No hepatomegaly;  Normal bowel sounds  DERM: No rashes  NEUR: Normal gait, Grossly normal tone.  PSY: Normal mood and affect      Results / Data:   ECG:   (01/18/2024) - Atrioventricular paced rhythm. QRSD 208ms, left-bundle morphology.  (10/19/2023) - Atrioventricular paced rhythm. QRSD 212ms, left-bundle " morphology.  (10/18/2018) - Sinus rhythm with AS-.    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   Atrial situs solitus.   Atrioventricular alignment is discordant.   L-loop ventricles.   Normal structure of systemic tricuspid valve.  Normal structure of subpulmonary mitral valve.  Qualitatively normal appearance of the subpulmonary left ventricle.  Qualitatively severe dilation and at least mild hypertrophy of systemic right ventricle.  The ventriculoarterial alignment is discordant.   The pulmonary valve is structurally normal.   Normal trileaflet aortic valve.   Cardiac position is levocardia.   Normal size aortic arch with no evidence of coarctation.     IMPRESSION:  Qualitative impression of mildly dilated right atrium with two pacing wires demonstrated.  Some images suggest that both pacing wires cross the subpulmonary mitral valve to the subpulmonary left ventricle.  There is mild-to-moderate associated subpulmonary mitral insufficiency with no evidence of stenosis.  Qualitative impression normal size and structure of the subpulmonary left ventricle with good systolic function.  Subpulmonary left ventricular pressure estimated 22 mmHg from well-developed Doppler profile.  Normal flow across left ventricular outflow tract and pulmonary valve with no significant pulmonary insufficiency demonstrated.  Normal size confluent pulmonary arteries demonstrated.  The left atrial volume index is mildly enlarged measuring 38 ml/m2 (BMI =35 - visually moderate to severely enlarged).   Systemic tricuspid valve with no evidence of stenosis and mild to moderate insufficiency.  Severe dilation and mild hypertrophy of the systemic right ventricle.   Paradoxical septal motion with poor movement of the LV free wall and EF estimated  30% from apical views.  Normal flow is demonstrated across the aortic valve with no significant insufficiency.    Normal size aorta with no evidence of coarctation.  No pericardial effusion.    (07/10/2023)  l-transposition of the great arteries (congenitally corrected transposition).   No septal defects present Mild tricuspid insuffiency (systemic AVV).   Moderate to severe right ventricular enlargement (systemic ventricle).   Mildly depressed right ventricular contractility (systemic ventricle).   No pericardial effusion.     Device Interrogation:  (01/18/2024)    General comments: Device interrogation and lead testing performed. Device and leads WNL. Presenting rhythm AS/ Patient symptomatic with ventricular sensing test; No R wave noted @ VVI 40, test suspended. No R wave trend recorded since last session. Patient activity 5.7 hr/day    2 Monitored VT episodes - October 2023 lasting 2 seconds, 10 beats, Avg V rate 197. January 2024 lasting 1 second, 6 beats, Avg 200 BPM.    Thresholds RA Lead: 0.75 V @ 0.4 ms. Configuration: bipolar. RV Lead: 0.75 V @ 0.4 ms. Configuration: bipolar.    RA Lead: P/R-wave: 4.6 mV Lead Impedance: 361 Ohms Paced: 21% RV Lead: P/R-wave: (none)Paced. mV Impedance: 437 Ohms Paced: 100%    Chamber type: dual. Mode: DDDR Lower limit rate: 60 bpm Upper tracking rate: 130 bpm Max sensor rate: 130 bpm    (10/19/2023)    RA Lead: P/R-wave: 3.9 mV Lead Impedance: 342 Ohms Paced: 12% RV Lead: P/R-wave: (none)Paced. mV Impedance: 399 Ohms Paced: 100%    Mode: DDDR Lower limit rate: 60 bpm Upper tracking rate: 130 bpm Max sensor rate: 130 bpm    General comments: Device interrogation and lead testing performed. Device and leads WNL. No new Arrhythmias noted. 4 NS-VT episodes noted on a prior remote transmission by her usual Provider. 1 in June 2023 and 3 in September 2023. Presenting Rhythm AS/, occasional AP noted. Patient symptomatic when checking underlying ventricular rhythm. No R wave  noted at VVI 40.    Reprogramming comments: No changes this session.    CXR:  (01/18/2024)      Assessment / Plan:   Siria Villegas is a 43 y.o. female with Congential-Corrected L-TGA with significantly reduced systemic right ventricular function; Complete AV-block s/p dual-chamber pacemaker; who presents to [facility] at [facility]. She was referred by Dr. Dumont to Dr. Bernardo and myself.    I'm in agreement with Dr. Bernardo that starting Entresto as wise in her situation.  I also think there could be benefit in adding a coronary sinus/LV pacing lead as her ECG shows a left bundle pattern with a particularly wide paced QRS complex.  Placing this particular lead possibly can be challenging given a congenital dysmorphology, though placement and outcomes are overall optimistic, even if limited.    Given her EF and hx of NS-VT, it is reasonable to consider ICD placement. However, given that 2 leads already cross her tricuspid valve, an additional lead may cause significant TR. As such, extraction could be indicated, but will review procedure with our extraction team.        Follow-up:   Virtual visit with Dr. Bernardo in 1 month.  Will discuss timing of CRT.  Cardiac medications:    Entresto  Lasix  Digoxin  Metoprolol XL    Please contact us if he has any questions or concerns.  Our clinic from his [phone] during office hours. For urgent night and weekend concerns, call [phone] and ask for the pediatric cardiologist on call to be paged.

## 2024-01-18 ENCOUNTER — TELEPHONE (OUTPATIENT)
Dept: TRANSPLANT | Facility: CLINIC | Age: 44
End: 2024-01-18
Payer: COMMERCIAL

## 2024-01-18 ENCOUNTER — CLINICAL SUPPORT (OUTPATIENT)
Dept: CARDIOLOGY | Facility: CLINIC | Age: 44
End: 2024-01-18
Attending: PEDIATRICS
Payer: COMMERCIAL

## 2024-01-18 ENCOUNTER — HOSPITAL ENCOUNTER (OUTPATIENT)
Dept: CARDIOLOGY | Facility: HOSPITAL | Age: 44
Discharge: HOME | End: 2024-01-18
Attending: PEDIATRICS
Payer: COMMERCIAL

## 2024-01-18 ENCOUNTER — HOSPITAL ENCOUNTER (OUTPATIENT)
Dept: CARDIOLOGY | Facility: CLINIC | Age: 44
Discharge: HOME | End: 2024-01-18
Payer: COMMERCIAL

## 2024-01-18 ENCOUNTER — HOSPITAL ENCOUNTER (OUTPATIENT)
Dept: RADIOLOGY | Facility: HOSPITAL | Age: 44
Discharge: HOME | End: 2024-01-18
Attending: PEDIATRICS
Payer: COMMERCIAL

## 2024-01-18 ENCOUNTER — OFFICE VISIT (OUTPATIENT)
Dept: CARDIOLOGY | Facility: CLINIC | Age: 44
End: 2024-01-18
Payer: COMMERCIAL

## 2024-01-18 ENCOUNTER — PATIENT MESSAGE (OUTPATIENT)
Dept: CARDIOLOGY | Facility: CLINIC | Age: 44
End: 2024-01-18

## 2024-01-18 ENCOUNTER — OFFICE VISIT (OUTPATIENT)
Dept: PULMONOLOGY | Facility: CLINIC | Age: 44
End: 2024-01-18
Payer: COMMERCIAL

## 2024-01-18 VITALS
DIASTOLIC BLOOD PRESSURE: 53 MMHG | HEART RATE: 82 BPM | BODY MASS INDEX: 36.13 KG/M2 | OXYGEN SATURATION: 97 % | SYSTOLIC BLOOD PRESSURE: 111 MMHG | WEIGHT: 230.19 LBS | DIASTOLIC BLOOD PRESSURE: 53 MMHG | WEIGHT: 230.19 LBS | HEIGHT: 67 IN | SYSTOLIC BLOOD PRESSURE: 111 MMHG | HEART RATE: 82 BPM | HEIGHT: 67 IN | BODY MASS INDEX: 36.13 KG/M2 | OXYGEN SATURATION: 97 %

## 2024-01-18 VITALS — BODY MASS INDEX: 36.1 KG/M2 | HEIGHT: 67 IN | WEIGHT: 230 LBS

## 2024-01-18 VITALS — WEIGHT: 222 LBS | HEIGHT: 67 IN | HEART RATE: 70 BPM | BODY MASS INDEX: 34.84 KG/M2

## 2024-01-18 DIAGNOSIS — Q24.9 ADULT CONGENITAL HEART DISEASE: ICD-10-CM

## 2024-01-18 DIAGNOSIS — Q24.9 ADULT CONGENITAL HEART DISEASE: Primary | ICD-10-CM

## 2024-01-18 DIAGNOSIS — Q24.9 HEART FAILURE DUE TO CONGENITAL HEART DISEASE: ICD-10-CM

## 2024-01-18 DIAGNOSIS — I50.9 HEART FAILURE DUE TO CONGENITAL HEART DISEASE: ICD-10-CM

## 2024-01-18 DIAGNOSIS — I36.1 NON-RHEUMATIC TRICUSPID VALVE INSUFFICIENCY: ICD-10-CM

## 2024-01-18 DIAGNOSIS — I48.0 PAROXYSMAL ATRIAL FIBRILLATION: ICD-10-CM

## 2024-01-18 DIAGNOSIS — Q20.5 CONGENITALLY CORRECTED TRANSPOSITION OF GREAT ARTERIES: ICD-10-CM

## 2024-01-18 DIAGNOSIS — I44.2 COMPLETE HEART BLOCK: ICD-10-CM

## 2024-01-18 DIAGNOSIS — Z95.0 CARDIAC PACEMAKER IN SITU: ICD-10-CM

## 2024-01-18 DIAGNOSIS — I50.22 CHRONIC SYSTOLIC CONGESTIVE HEART FAILURE: ICD-10-CM

## 2024-01-18 DIAGNOSIS — I50.9 CONGESTIVE HEART FAILURE, UNSPECIFIED HF CHRONICITY, UNSPECIFIED HEART FAILURE TYPE: Primary | ICD-10-CM

## 2024-01-18 DIAGNOSIS — Q20.5 CONGENITALLY CORRECTED TRANSPOSITION OF GREAT ARTERIES: Primary | ICD-10-CM

## 2024-01-18 DIAGNOSIS — I44.2 COMPLETE HEART BLOCK: Primary | ICD-10-CM

## 2024-01-18 DIAGNOSIS — G47.9 SLEEP DISORDER: Primary | ICD-10-CM

## 2024-01-18 LAB
ASCENDING AORTA: 3.02 CM
AV MEAN GRADIENT: 4 MMHG
AV PEAK GRADIENT: 7 MMHG
BSA FOR ECHO PROCEDURE: 2.18 M2
DOP CALC AO PEAK VEL: 1.32 M/S
DOP CALC AO VTI: 26.91 CM
DOP CALC LVOT AREA: 4.4 CM2
DOP CALC LVOT DIAMETER: 2.36 CM
LA MAJOR: 6.01 CM
LA WIDTH: 4.11 CM
LEFT ATRIUM VOLUME INDEX MOD: 38.1 ML/M2
LEFT ATRIUM VOLUME MOD: 80.37 CM3
PV PEAK GRADIENT: 4 MMHG
PV PEAK VELOCITY: 1.03 M/S
SINUS: 2.91 CM
STJ: 2.44 CM

## 2024-01-18 PROCEDURE — 93280 PM DEVICE PROGR EVAL DUAL: CPT | Mod: S$GLB,,, | Performed by: PEDIATRICS

## 2024-01-18 PROCEDURE — 3074F SYST BP LT 130 MM HG: CPT | Mod: CPTII,S$GLB,, | Performed by: PEDIATRICS

## 2024-01-18 PROCEDURE — 1160F RVW MEDS BY RX/DR IN RCRD: CPT | Mod: CPTII,95,NTX, | Performed by: NURSE PRACTITIONER

## 2024-01-18 PROCEDURE — 93303 ECHO TRANSTHORACIC: CPT | Mod: 26,,, | Performed by: PEDIATRICS

## 2024-01-18 PROCEDURE — 3078F DIAST BP <80 MM HG: CPT | Mod: CPTII,S$GLB,, | Performed by: PEDIATRICS

## 2024-01-18 PROCEDURE — 99999 PR PBB SHADOW E&M-EST. PATIENT-LVL III: CPT | Mod: PBBFAC,,, | Performed by: PEDIATRICS

## 2024-01-18 PROCEDURE — 4010F ACE/ARB THERAPY RXD/TAKEN: CPT | Mod: CPTII,S$GLB,, | Performed by: PEDIATRICS

## 2024-01-18 PROCEDURE — 99214 OFFICE O/P EST MOD 30 MIN: CPT | Mod: S$GLB,,, | Performed by: PEDIATRICS

## 2024-01-18 PROCEDURE — 71046 X-RAY EXAM CHEST 2 VIEWS: CPT | Mod: TC,FY

## 2024-01-18 PROCEDURE — 1159F MED LIST DOCD IN RCRD: CPT | Mod: CPTII,95,NTX, | Performed by: NURSE PRACTITIONER

## 2024-01-18 PROCEDURE — 4010F ACE/ARB THERAPY RXD/TAKEN: CPT | Mod: CPTII,95,NTX, | Performed by: NURSE PRACTITIONER

## 2024-01-18 PROCEDURE — 99999 PR PBB SHADOW E&M-EST. PATIENT-LVL I: CPT | Mod: PBBFAC,,,

## 2024-01-18 PROCEDURE — 93325 DOPPLER ECHO COLOR FLOW MAPG: CPT | Mod: 26,,, | Performed by: PEDIATRICS

## 2024-01-18 PROCEDURE — 99999 PR PBB SHADOW E&M-EST. PATIENT-LVL IV: CPT | Mod: PBBFAC,,, | Performed by: PEDIATRICS

## 2024-01-18 PROCEDURE — 3008F BODY MASS INDEX DOCD: CPT | Mod: CPTII,S$GLB,, | Performed by: PEDIATRICS

## 2024-01-18 PROCEDURE — 93290 INTERROG DEV EVAL ICPMS IP: CPT | Mod: S$GLB,,, | Performed by: PEDIATRICS

## 2024-01-18 PROCEDURE — 93005 ELECTROCARDIOGRAM TRACING: CPT | Mod: S$GLB,,, | Performed by: PEDIATRICS

## 2024-01-18 PROCEDURE — 93325 DOPPLER ECHO COLOR FLOW MAPG: CPT

## 2024-01-18 PROCEDURE — 3008F BODY MASS INDEX DOCD: CPT | Mod: CPTII,95,NTX, | Performed by: NURSE PRACTITIONER

## 2024-01-18 PROCEDURE — 93320 DOPPLER ECHO COMPLETE: CPT | Mod: 26,,, | Performed by: PEDIATRICS

## 2024-01-18 PROCEDURE — 71046 X-RAY EXAM CHEST 2 VIEWS: CPT | Mod: 26,,, | Performed by: RADIOLOGY

## 2024-01-18 PROCEDURE — 93010 ELECTROCARDIOGRAM REPORT: CPT | Mod: S$GLB,,, | Performed by: INTERNAL MEDICINE

## 2024-01-18 PROCEDURE — 99203 OFFICE O/P NEW LOW 30 MIN: CPT | Mod: 95,NTX,, | Performed by: NURSE PRACTITIONER

## 2024-01-18 RX ORDER — FUROSEMIDE 40 MG/1
40 TABLET ORAL 2 TIMES DAILY
Qty: 180 TABLET | Refills: 3 | Status: SHIPPED | OUTPATIENT
Start: 2024-01-18 | End: 2025-01-17

## 2024-01-18 NOTE — PROGRESS NOTES
2024    re:Siria Villegas  :1980     Nick Elaine MD  [address] [facility] [address]    Dr. Matt Pacheco    [facility]    Siria Villegas is a 43 y.o. female is seen today for an initial consultation in my [facility] at the request of Dr. Matt Dumont, her primary cardiologist in [place].  To summarize, her diagnoses are as follows:  1.  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  Reduced systemic right ventricular function with worsening heart failure symptoms  3.  Normal appearing tricuspid valve with mild to moderrate tricuspid insufficiency  4.  History of supraventricular arrhythmias.  Some nonsustained ventricular tachycardia noted on pacemaker evaluation.  5.  Complete heart block with transvenous pacemaker - wide complex     My recommendations are as follows:  1.  Continue current medications  2.  Check BMP and NT pro BNP today  3.  Referral to our heart failure team in [place].  4.  EP team to investigate CRT, consider ICD placement at the same time  5.  Referral for sleep study - she has been waiting to hear from the [place] [facility], and I will reach out to them  6.  Increase Lasix to twice a day  7.  I will add her on for discussion in our Adult Congenital Heart Disease conference.  She has not had a right heart catheterization recently, and I think she would benefit from knowing those numbers once she is medically optimized.  We may be able to run a right heart catheterization at the time of her EP procedure.  8.  At a minimum, follow up with me in 6 months with repeat echo.  However, will likely see her sooner.    Discussion:  I again had a long discussion with the patient:  1. She has a systemic right ventricle with decreased ventricular function.  Although there is some limited data suggesting clinical  improvement with goal-directed medical therapy in systemic right ventricule patients, that data is certainly limited.  She really has not had much improvement on Entresto.  It does sound like she puts on a lot of weight during the day, so we are going to try twice a day Lasix.  2. She has a wide complex QRS related to her pacing.  There is also some limited data about CRT in these systemic right ventricle patients.  I definitely think it is worth investigating that, anything we can do to help her function would likely delay the need for a heart transplant.    3. She has decreased systemic right ventricular function and a history of nonsustained ventricular tachycardia.  I do think she would benefit from a defibrillator.    4. Ultimately, I think there is a significant chance she will need a heart transplant.  I am going to refer her to the heart failure team.      History of present illness:  Overall, she is unchanged compared to when I last talked to her.  She fatigues easily.  She thinks maybe this is a little worse than it was a month ago.  She sometimes complains of a racing heartbeat, typically after she has exerted herself.  This gradually resolves when she sits and rests.  No edema.  However, her weight fluctuates over 5 lb during the day.  She currently takes her Lasix once a day, and she has a good diuresis with it.  No syncope.  No chest pain.  She still has not had her sleep study.  She is waiting for the [facility] to contact her.    At her last virtual visit, I bumped up her Entresto.  I subsequently added aldactone after labs looked good.    She works as a teacher.    The review of systems is as noted above. It is otherwise negative for other symptoms related to the general, neurological, psychiatric, endocrine, gastrointestinal, genitourinary, respiratory, dermatologic, musculoskeletal, hematologic, and immunologic systems.    Past Medical History:   Diagnosis Date    Asthma      Congestive heart failure      Past Surgical History:   Procedure Laterality Date    CARDIAC PACEMAKER PLACEMENT  07/26/2018    CYST REMOVAL      age 2 years old, from eyebrow area     Family History   Problem Relation Age of Onset    Cancer Mother     Stroke Mother     Heart failure Mother     Hyperlipidemia Father     Skin cancer Father      Social History     Socioeconomic History    Marital status: Single   Tobacco Use    Smoking status: Never    Smokeless tobacco: Never   Substance and Sexual Activity    Alcohol use: No    Drug use: No   Social History Narrative    Works as a teacher in [employer] at [employer].  No children.     Social Determinants of Health     Financial Resource Strain: Low Risk  (1/17/2024)    Overall Financial Resource Strain (CARDIA)     Difficulty of Paying Living Expenses: Not very hard   Food Insecurity: No Food Insecurity (1/17/2024)    Hunger Vital Sign     Worried About Running Out of Food in the Last Year: Never true     Ran Out of Food in the Last Year: Never true   Transportation Needs: No Transportation Needs (1/17/2024)    PRAPARE - Transportation     Lack of Transportation (Medical): No     Lack of Transportation (Non-Medical): No   Physical Activity: Sufficiently Active (1/17/2024)    Exercise Vital Sign     Days of Exercise per Week: 5 days     Minutes of Exercise per Session: 90 min   Stress: Stress Concern Present (1/17/2024)    [employer] - Occupational Stress Questionnaire     Feeling of Stress : To some extent   Social Connections: Unknown (1/17/2024)    Social Connection and Isolation Panel [NHANES]     Frequency of Communication with Friends and Family: Twice a week     Frequency of Social Gatherings with Friends and Family: Twice a week     Active Member of Clubs or Organizations: No     Attends Club or Organization Meetings: Patient declined     Marital Status: Never    Housing Stability: Low Risk  (1/17/2024)     Housing Stability Vital Sign     Unable to Pay for Housing in the Last Year: No     Number of Places Lived in the Last Year: 1     Unstable Housing in the Last Year: No     Current Outpatient Medications on File Prior to Visit   Medication Sig Dispense Refill    ALBUTEROL SULFATE INHL albuterol sulfate Take No date recorded No form recorded No frequency recorded No route recorded No set duration recorded No set duration amount recorded active No dosage strength recorded No dosage strength units of measure recorded      aspirin (ECOTRIN) 81 MG EC tablet Take 81 mg by mouth once daily.       cetirizine (ZYRTEC) 10 MG tablet Take 1 tablet by mouth every morning.      digoxin (LANOXIN) 250 mcg tablet Take 0.5 tablets (125 mcg total) by mouth once daily. 45 tablet 3    ferrous gluconate (FERGON) 324 MG tablet Take 324 mg by mouth.      fluticasone propionate (FLONASE ALLERGY RELIEF NASL) Flonase Allergy Relief Take No date recorded No form recorded No frequency recorded No route recorded No set duration recorded No set duration amount recorded active No dosage strength recorded No dosage strength units of measure recorded      furosemide (LASIX) 40 MG tablet Take 1 tablet (40 mg total) by mouth once daily. 90 tablet 3    guaifenesin/phenylephrine HCl (MUCINEX COLD ORAL) Mucinex Take No date recorded No form recorded No frequency recorded No route recorded No set duration recorded No set duration amount recorded suspended No dosage strength recorded No dosage strength units of measure recorded      metoprolol succinate (TOPROL-XL) 25 MG 24 hr tablet Take 25 mg by mouth 2 (two) times daily.       propafenone (RHTHYMOL) 150 MG Tab Take 150 mg by mouth 2 (two) times daily. One tablet in the morning and two at night      sacubitriL-valsartan (ENTRESTO) 49-51 mg per tablet Take 1 tablet by mouth 2 (two) times daily. 60 tablet 11    sertraline (ZOLOFT) 100 MG tablet Take 100 mg by mouth.      spironolactone (ALDACTONE) 25 MG  "tablet Take 1 tablet (25 mg total) by mouth once daily. 30 tablet 11    TRI-SPRINTEC, 28, 0.18/0.215/0.25 mg-35 mcg (28) tablet Take 1 tablet by mouth once daily.        No current facility-administered medications on file prior to visit.     Review of patient's allergies indicates:   Allergen Reactions    Penicillins Hives     As a child.     BP (!) 111/53 (BP Location: Left arm)   Pulse 82   Ht 5' 7.01" (1.702 m)   Wt 104.4 kg (230 lb 2.6 oz)   SpO2 97%   BMI 36.04 kg/m²   In general, she is an obese but otherwise very healthy-appearing nondysmorphic female in no apparent distress.  The eyes, nares, and oropharynx are clear.  Eyelids and conjunctiva are normal without drainage or erythema.  Pupils equal and round bilaterally.  The head is normocephalic and atraumatic.  The neck is supple without jugular venous distention or thyroid enlargement.  The lungs are clear to auscultation bilaterally.  There are no scars on the chest wall.  The point of maximum impulse is somewhat more midline than typical.  The 1st heart sound is normal. The 2nd heart sound is single.  I do not hear a murmur.  The pacemaker is positioned in the right upper chest.  The abdominal exam is benign without hepatosplenomegaly, tenderness, or distention.  Pulses are normal in all 4 extremities with brisk capillary refill and no clubbing, cyanosis, or edema.  No rashes are noted.     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  Atrial situs solitus.  Atrioventricular alignment is discordant.  L-loop ventricles.  Normal structure of systemic tricuspid valve.  Normal structure of subpulmonary mitral valve.  Qualitatively normal appearance of the subpulmonary left " ventricle.  Qualitatively severe dilation and at least mild hypertrophy of systemic right ventricle.  The ventriculoarterial alignment is discordant.  The pulmonary valve is structurally normal.  Normal trileaflet aortic valve.  Cardiac position is levocardia.  Normal size aortic arch with no evidence of coarctation.      IMPRESSION:  Qualitative impression of mildly dilated right atrium with two pacing wires demonstrated.  Some images suggest that both pacing wires cross the subpulmonary mitral valve to the subpulmonary left ventricle.  There is mild-to-moderate associated subpulmonary mitral insufficiency with no evidence of stenosis.  Qualitative impression normal size and structure of the subpulmonary left ventricle with good systolic function.  Subpulmonary left ventricular pressure estimated 22 mmHg from well-developed Doppler profile.  Normal flow across left ventricular outflow tract and pulmonary valve with no significant pulmonary insufficiency demonstrated.  Normal size confluent pulmonary arteries demonstrated.  The left atrial volume index is mildly enlarged measuring 38 ml/m2 (BMI =35 - visually moderate to severely enlarged).  Systemic tricuspid valve with no evidence of stenosis and mild to moderate insufficiency.  Severe dilation and mild hypertrophy of the systemic right ventricle.  Paradoxical septal motion with poor movement of the LV free wall and EF estimated 30% from apical views.  Normal flow is demonstrated across the aortic valve with no significant insufficiency.  Normal size aorta with no evidence of coarctation.  No pericardial effusion.    An EKG performed in clinic today reveals atrial sensing and ventricular pacing with good capture.  QRS is very wide.    I reviewed her echo from July 2023:  l-transposition of the great arteries (congenitally corrected transposition). No septal defects present Mild tricuspid insuffiency (systemic AVV). Moderate to severe right ventricular enlargement  (systemic ventricle). Mildly depressed right ventricular contractility (systemic ventricle). No pericardial effusion.     CMP  Sodium   Date Value Ref Range Status   12/06/2023 141 136 - 145 mmol/L Final     Potassium   Date Value Ref Range Status   12/06/2023 4.1 3.5 - 5.1 mmol/L Final     Chloride   Date Value Ref Range Status   12/06/2023 105 95 - 110 mmol/L Final     CO2   Date Value Ref Range Status   12/06/2023 20 (L) 23 - 29 mmol/L Final     Glucose   Date Value Ref Range Status   12/06/2023 69 (L) 70 - 110 mg/dL Final     BUN   Date Value Ref Range Status   12/06/2023 16 6 - 20 mg/dL Final     Creatinine   Date Value Ref Range Status   12/06/2023 0.6 0.5 - 1.4 mg/dL Final     Calcium   Date Value Ref Range Status   12/06/2023 8.9 8.7 - 10.5 mg/dL Final     Total Protein   Date Value Ref Range Status   12/06/2023 7.0 6.0 - 8.4 g/dL Final     Albumin   Date Value Ref Range Status   12/06/2023 3.2 (L) 3.5 - 5.2 g/dL Final     Total Bilirubin   Date Value Ref Range Status   12/06/2023 0.3 0.1 - 1.0 mg/dL Final     Comment:     For infants and newborns, interpretation of results should be based  on gestational age, weight and in agreement with clinical  observations.    Premature Infant recommended reference ranges:  Up to 24 hours.............<8.0 mg/dL  Up to 48 hours............<12.0 mg/dL  3-5 days..................<15.0 mg/dL  6-29 days.................<15.0 mg/dL       Alkaline Phosphatase   Date Value Ref Range Status   12/06/2023 83 55 - 135 U/L Final     AST   Date Value Ref Range Status   12/06/2023 13 10 - 40 U/L Final     ALT   Date Value Ref Range Status   12/06/2023 9 (L) 10 - 44 U/L Final     Anion Gap   Date Value Ref Range Status   12/06/2023 16 8 - 16 mmol/L Final     eGFR   Date Value Ref Range Status   12/06/2023 >60.0 >60 mL/min/1.73 m^2 Final         BNP   Date Value Ref Range Status   12/06/2023 177 (H) 0 - 99 pg/mL Final     Comment:     Values of less than 100 pg/ml are consistent with  non-CHF populations.      CMP from 8/22/23 with K 4.5, creatinine 0.69. (Care everywhere)    Thank you for referring this patient to our clinic.  Please call with any questions.    Sincerely,        Conor Bernardo MD  Pediatric Cardiology  Adult Congenital Heart Disease  Pediatric Heart Failure and Transplantation  [facility] [address] [place]  [address]  [phone]

## 2024-01-18 NOTE — PROGRESS NOTES
Subjective:      Patient ID: Siria Villegas is a 43 y.o. female.    Chief Complaint: Sleep Apnea  The patient location is: [place]  The chief complaint leading to consultation is: sleep apnea  Visit type: Virtual visit with synchronous audio and video  Total time spent with patient: 20 min   Each patient to whom he or she provides medical services by telemedicine is:  (1) informed of the relationship between the physician and patient and the respective role of any other health care provider with respect to management of the patient; and (2) notified that he or she may decline to receive medical services by telemedicine and may withdraw from such care at any time.    HPI    Patient presents for evaluation of sleep apnea.  Patient with snoring. Patient wakes up frequently throughout the night.  Patient does not wake up feeling refreshed in the morning.  Patient with daytime hypersomnolence.  [place] Sleepiness Scale score 20.  Patient has had symptoms for a few years and worse over the last year. Comorbidities include BMI 36, significant heart disease.   Bedtime: 8 PM  Wake time: 5 AM      STOP - BANG Questionnaire:     1. Snoring : Do you snore loudly ?    Yes    2. Tired : Do you often feel tired, fatigued, or sleepy during daytime?   Yes    3. Observed: Has anyone observed you stop breathing during your sleep?   No    4. Blood pressure : Do you have or are you being treated for high blood pressure?   Yes    5. BMI :BMI more than 35 kg/m2?   Yes    6. Age : Age over 50 yr old?   No    7. Neck circumference: Neck circumference greater than 40 cm?   No    8. Gender: Gender male?   No    High risk of JOSE ROBERTO: Yes 5 - 8  Intermediate risk of JOSE ROBERTO: Yes 3 - 4  Low risk of JOSE ROBERTO: Yes 0 - 2      References:   STOP Questionnaire   A Tool to Screen Patients for Obstructive Sleep Apnea: CORETTA Tristan, Star Watson M.B.B.S., Sussy Archer M.D.,Laila Grimm, Ph.D., Belkys Remy M.B.B.S.,_ Jaren London,  "M.Sc.,_ Sommer De La Rosa M.D., ISA TinajeroREstephaniaC.P.C.; Anesthesiology 2008; 108:812-21 Copyright © 2008, the American Society of Anesthesiologists, Inc. [employer]    Patient Active Problem List   Diagnosis    Adult congenital heart disease    Congenitally corrected transposition of great arteries    Non-rheumatic tricuspid valve insufficiency    Complete heart block    Other fatigue    Paroxysmal atrial fibrillation    Congestive heart failure    Heart failure due to congenital heart disease         Ht 5' 7" (1.702 m)   Wt 104.3 kg (230 lb)   BMI 36.02 kg/m²   Body mass index is 36.02 kg/m².    Review of Systems   Constitutional:  Positive for fatigue.   Respiratory:  Positive for snoring and somnolence.    Psychiatric/Behavioral:  Positive for sleep disturbance.    All other systems reviewed and are negative.        Objective:      Physical Exam  Constitutional:       Appearance: She is well-developed. She is obese.   HENT:      Head: Normocephalic and atraumatic.   Pulmonary:      Effort: Pulmonary effort is normal. No tachypnea, bradypnea, accessory muscle usage or respiratory distress.   Musculoskeletal:      Cervical back: Normal range of motion.   Skin:     Findings: No rash.   Neurological:      Mental Status: She is alert and oriented to person, place, and time.   Psychiatric:         Behavior: Behavior normal.         Thought Content: Thought content normal.         Judgment: Judgment normal.         Assessment:     1. Sleep disorder    2. Heart failure due to congenital heart disease    3. Chronic systolic congestive heart failure    4. Paroxysmal atrial fibrillation    5. Non-rheumatic tricuspid valve insufficiency       Outpatient Encounter Medications as of 1/18/2024   Medication Sig Dispense Refill    ALBUTEROL SULFATE INHL albuterol sulfate Take No date recorded No form recorded No frequency recorded No route recorded No set duration recorded No set duration amount " recorded active No dosage strength recorded No dosage strength units of measure recorded      aspirin (ECOTRIN) 81 MG EC tablet Take 81 mg by mouth once daily.       cetirizine (ZYRTEC) 10 MG tablet Take 1 tablet by mouth every morning.      digoxin (LANOXIN) 250 mcg tablet Take 0.5 tablets (125 mcg total) by mouth once daily. 45 tablet 3    ferrous gluconate (FERGON) 324 MG tablet Take 324 mg by mouth.      fluticasone propionate (FLONASE ALLERGY RELIEF NASL) Flonase Allergy Relief Take No date recorded No form recorded No frequency recorded No route recorded No set duration recorded No set duration amount recorded active No dosage strength recorded No dosage strength units of measure recorded      furosemide (LASIX) 40 MG tablet Take 1 tablet (40 mg total) by mouth 2 (two) times a day. 180 tablet 3    guaifenesin/phenylephrine HCl (MUCINEX COLD ORAL) Mucinex Take No date recorded No form recorded No frequency recorded No route recorded No set duration recorded No set duration amount recorded suspended No dosage strength recorded No dosage strength units of measure recorded      metoprolol succinate (TOPROL-XL) 25 MG 24 hr tablet Take 25 mg by mouth 2 (two) times daily.       propafenone (RHTHYMOL) 150 MG Tab Take 150 mg by mouth 2 (two) times daily. One tablet in the morning and two at night      sacubitriL-valsartan (ENTRESTO) 49-51 mg per tablet Take 1 tablet by mouth 2 (two) times daily. 60 tablet 11    sertraline (ZOLOFT) 100 MG tablet Take 100 mg by mouth.      spironolactone (ALDACTONE) 25 MG tablet Take 1 tablet (25 mg total) by mouth once daily. 30 tablet 11    TRI-SPRINTEC, 28, 0.18/0.215/0.25 mg-35 mcg (28) tablet Take 1 tablet by mouth once daily.       [DISCONTINUED] furosemide (LASIX) 40 MG tablet Take 1 tablet (40 mg total) by mouth once daily. 90 tablet 3     No facility-administered encounter medications on file as of 1/18/2024.     Orders Placed This Encounter   Procedures    Polysomnogram (CPAP  will be added if patient meets diagnostic criteria.)     Standing Status:   Future     Standing Expiration Date:   1/17/2025     Plan:   Needs in lab sleep study due to:  Adult congenital heart disease   Congenitally corrected transposition of great arteries   Non-rheumatic tricuspid valve insufficiency   Complete heart block      Paroxysmal atrial fibrillation   Congestive heart failure   Heart failure due to congenital heart disease       Follow up to review      Problem List Items Addressed This Visit          Cardiac/Vascular    Non-rheumatic tricuspid valve insufficiency    Paroxysmal atrial fibrillation    Overview     Followed by Dr. Dmitriy Pacheco         Congestive heart failure    Heart failure due to congenital heart disease     Other Visit Diagnoses       Sleep disorder    -  Primary    Relevant Orders    Polysomnogram (CPAP will be added if patient meets diagnostic criteria.)           Thank you Dr. Meyers for this consultation.

## 2024-01-19 LAB
AV DELAY - LONGEST: 200 MSEC
BATTERY VOLTAGE (V): 2.98 V
ERI (V): 2.63 V
IMPEDANCE RA LEAD (NATIVE): 437 OHMS
IMPEDANCE RA LEAD: 361 OHMS
OHS CV DC PP MS1: 0.4 MS
OHS CV DC PP MS2: 0.4 MS
OHS CV DC PP V1: NORMAL V
OHS CV DC PP V2: NORMAL V
P/R-WAVE RA LEAD: 4.6 MV
PV DELAY - LONGEST: 170 MSEC
THRESHOLD MS RA LEAD (NATIVE): 0.4 MS
THRESHOLD MS RA LEAD: 0.4 MS
THRESHOLD V RA LEAD (NATIVE): 0.75 V
THRESHOLD V RA LEAD: 0.75 V

## 2024-02-09 ENCOUNTER — HOSPITAL ENCOUNTER (OUTPATIENT)
Dept: SLEEP MEDICINE | Facility: HOSPITAL | Age: 44
Discharge: HOME | End: 2024-02-09
Attending: NURSE PRACTITIONER
Payer: COMMERCIAL

## 2024-02-09 DIAGNOSIS — F51.04 PSYCHOPHYSIOLOGICAL INSOMNIA: ICD-10-CM

## 2024-02-09 DIAGNOSIS — Z72.821 INADEQUATE SLEEP HYGIENE: ICD-10-CM

## 2024-02-09 DIAGNOSIS — G47.33 OBSTRUCTIVE SLEEP APNEA: Primary | ICD-10-CM

## 2024-02-09 PROCEDURE — 95810 POLYSOM 6/> YRS 4/> PARAM: CPT | Mod: TXP

## 2024-02-12 PROBLEM — G47.9 SLEEP DISORDER: Status: ACTIVE | Noted: 2024-02-12

## 2024-02-15 PROBLEM — G47.33 OBSTRUCTIVE SLEEP APNEA: Status: ACTIVE | Noted: 2024-02-15

## 2024-02-15 PROCEDURE — 95810 POLYSOM 6/> YRS 4/> PARAM: CPT | Mod: 26,NTX,, | Performed by: PSYCHOLOGIST

## 2024-02-15 NOTE — PROCEDURES
"Patient Name: Siria Villegas      Date of Report: 24   Study Date:  2024  [facility] No.: [id number]      : 1980    Time of Study:  09:11:01 PM - 05:00:01 AM   Sex:  Female   Age:  43 year   Weight:  230.0 lbs    Height:  5' 7"     Type of Study:  Diagnostic    REASONS FOR REFERRAL: Ms Villegas is a 43 year year old female, referred for diagnostic polysomnography by Elizabeth LeJeune, ACNP based on the patient's reported loud snoring, frequent nocturnal awakenings, unrefreshing sleep and daytime hypersomnolence.  Her [place] Sleepiness Scale score was 20, clinically significant, and her STOP-BANG score was 4, intermediate risk of JOSE ROBERTO.  Dr. Mansoor Meyers was the originating referring physician and Dr. Nick Elaine is the patient's primary care physician.    STUDY PARAMETERS: This diagnostic study involved analysis of the patient's sleep pattern while breathing unassisted. The study was performed with a sleep technologist in attendance for the entire test period, with video monitoring throughout the study, and routine laboratory clinical parameters recorded:  NOTE:  This polysomnographic sleep study was reviewed epoch-by-epoch, interpreted and signed below by an American Academy of Sleep Medicine Board Certified Sleep Specialist    SUMMARY STATEMENTS  DIAGNOSTIC IMPRESSIONS  G47.33   /  327.23 Mild Obstructive Sleep Apnea, Adult (OSAHS)  F51.04   /  307.42 Psychophysiological Insomnia (stress - related and conditioned)    Z72.821 /  V69.4 Inadequate Sleep Hygiene    PRIMARY TREATMENT RECOMMENDATIONS  Treat, or refer to [facility] Disorders Center.  The diagnostic polysomnography revealed mild obstructive sleep apnea / hypopnea syndrome (A + H Index = 12.5 events / hr asleep with 6.3 respiratory event - related arousals / hr asleep for the study, and no RERAs (respiratory effort -  related arousals).  The SpO2 value was 93.2 %, moderate, minimum oxygen saturation during sleep was 84.0 %, and the maximum " waking baseline SpO2 was 98.0 %.  Persistent, mild to moderately loud snoring was noted.  A CPAP titration polysomnography is recommended.  Weight loss to the normal range is recommended as it can decrease respiratory events and snoring in overweight patients.  The following changes in sleep hygiene / sleep - related behavior are recommended after medical treatments are successful  Regular bedtimes and wake times, including weekends: Total sleep time / night should not be more than one hour more than usual, and bedtime or wake time should not be more than one hour earlier or later than usual.    Do not attempt to make up lost sleep by extending sleep periods.    Avoid naps; none longer than 20 min or later than mid - afternoon.     SECONDARY TREATMENT RECOMMENDATIONS  Treat, or refer to [facility] if problems are not satisfactorily resolved by the above.  If insomnia persists after treatments for medical sleep disorders have proven effective, recommend follow - up inquiry regarding frequency, duration and nature of reported delayed sleep onset, poor sleep maintenance and involuntary early awakening (stress - related and / or conditioned psychophysiological insomnia) and referral for behavioral and cognitive - behavioral treatment of insomnia, as indicated.  Please see SDI.  Review the basis for prescribing antidepressant medication.  Sleep disorders of the type and magnitude Ms Villegas has frequently produce many of the signs and symptoms of depression.    See below for a complete interpretation of data from the polysomnography and Sleep Disorders Inventory.     Thank you for referring this patient to the [facility].      Iván Blackburn, Ph.D., ABPP; Diplomate, American Board of Sleep Medicine

## 2024-02-19 ENCOUNTER — CLINICAL SUPPORT (OUTPATIENT)
Dept: TRANSPLANT | Facility: CLINIC | Age: 44
End: 2024-02-19
Payer: COMMERCIAL

## 2024-02-19 ENCOUNTER — OFFICE VISIT (OUTPATIENT)
Dept: TRANSPLANT | Facility: CLINIC | Age: 44
End: 2024-02-19
Payer: COMMERCIAL

## 2024-02-19 ENCOUNTER — LAB VISIT (OUTPATIENT)
Dept: LAB | Facility: HOSPITAL | Age: 44
End: 2024-02-19
Attending: INTERNAL MEDICINE
Payer: COMMERCIAL

## 2024-02-19 ENCOUNTER — HOSPITAL ENCOUNTER (OUTPATIENT)
Dept: PULMONOLOGY | Facility: CLINIC | Age: 44
Discharge: HOME | End: 2024-02-19
Payer: COMMERCIAL

## 2024-02-19 VITALS — BODY MASS INDEX: 34.56 KG/M2 | HEIGHT: 68 IN | WEIGHT: 228 LBS

## 2024-02-19 VITALS
HEIGHT: 68 IN | DIASTOLIC BLOOD PRESSURE: 55 MMHG | BODY MASS INDEX: 35.35 KG/M2 | HEART RATE: 78 BPM | SYSTOLIC BLOOD PRESSURE: 117 MMHG | WEIGHT: 233.25 LBS

## 2024-02-19 DIAGNOSIS — I50.9 CONGESTIVE HEART FAILURE, UNSPECIFIED HF CHRONICITY, UNSPECIFIED HEART FAILURE TYPE: ICD-10-CM

## 2024-02-19 DIAGNOSIS — Q24.9 ADULT CONGENITAL HEART DISEASE: Primary | ICD-10-CM

## 2024-02-19 DIAGNOSIS — I36.1 NON-RHEUMATIC TRICUSPID VALVE INSUFFICIENCY: ICD-10-CM

## 2024-02-19 DIAGNOSIS — G47.33 OBSTRUCTIVE SLEEP APNEA: ICD-10-CM

## 2024-02-19 DIAGNOSIS — Q24.9 HEART FAILURE DUE TO CONGENITAL HEART DISEASE: ICD-10-CM

## 2024-02-19 DIAGNOSIS — I50.9 HEART FAILURE DUE TO CONGENITAL HEART DISEASE: ICD-10-CM

## 2024-02-19 DIAGNOSIS — I50.22 CHRONIC SYSTOLIC CONGESTIVE HEART FAILURE: Primary | ICD-10-CM

## 2024-02-19 DIAGNOSIS — Q20.5 CONGENITALLY CORRECTED TRANSPOSITION OF GREAT ARTERIES: ICD-10-CM

## 2024-02-19 LAB
ALBUMIN SERPL BCP-MCNC: 3.1 G/DL (ref 3.5–5.2)
ALP SERPL-CCNC: 76 U/L (ref 55–135)
ALT SERPL W/O P-5'-P-CCNC: 14 U/L (ref 10–44)
ANION GAP SERPL CALC-SCNC: 10 MMOL/L (ref 8–16)
AST SERPL-CCNC: 13 U/L (ref 10–40)
BASOPHILS # BLD AUTO: 0.11 K/UL (ref 0–0.2)
BASOPHILS NFR BLD: 1 % (ref 0–1.9)
BILIRUB SERPL-MCNC: 0.3 MG/DL (ref 0.1–1)
BNP SERPL-MCNC: 159 PG/ML (ref 0–99)
BUN SERPL-MCNC: 16 MG/DL (ref 6–20)
CALCIUM SERPL-MCNC: 9.2 MG/DL (ref 8.7–10.5)
CHLORIDE SERPL-SCNC: 106 MMOL/L (ref 95–110)
CO2 SERPL-SCNC: 24 MMOL/L (ref 23–29)
CREAT SERPL-MCNC: 0.7 MG/DL (ref 0.5–1.4)
DIFFERENTIAL METHOD BLD: ABNORMAL
EOSINOPHIL # BLD AUTO: 0.2 K/UL (ref 0–0.5)
EOSINOPHIL NFR BLD: 1.9 % (ref 0–8)
ERYTHROCYTE [DISTWIDTH] IN BLOOD BY AUTOMATED COUNT: 13.7 % (ref 11.5–14.5)
EST. GFR  (NO RACE VARIABLE): >60 ML/MIN/1.73 M^2
GLUCOSE SERPL-MCNC: 91 MG/DL (ref 70–110)
HCT VFR BLD AUTO: 40.2 % (ref 37–48.5)
HGB BLD-MCNC: 13 G/DL (ref 12–16)
IMM GRANULOCYTES # BLD AUTO: 0.04 K/UL (ref 0–0.04)
IMM GRANULOCYTES NFR BLD AUTO: 0.4 % (ref 0–0.5)
LYMPHOCYTES # BLD AUTO: 2.5 K/UL (ref 1–4.8)
LYMPHOCYTES NFR BLD: 21.6 % (ref 18–48)
MCH RBC QN AUTO: 30.2 PG (ref 27–31)
MCHC RBC AUTO-ENTMCNC: 32.3 G/DL (ref 32–36)
MCV RBC AUTO: 94 FL (ref 82–98)
MONOCYTES # BLD AUTO: 0.6 K/UL (ref 0.3–1)
MONOCYTES NFR BLD: 5.4 % (ref 4–15)
NEUTROPHILS # BLD AUTO: 8 K/UL (ref 1.8–7.7)
NEUTROPHILS NFR BLD: 69.7 % (ref 38–73)
NRBC BLD-RTO: 0 /100 WBC
PLATELET # BLD AUTO: 296 K/UL (ref 150–450)
PMV BLD AUTO: 10.3 FL (ref 9.2–12.9)
POTASSIUM SERPL-SCNC: 4 MMOL/L (ref 3.5–5.1)
PROT SERPL-MCNC: 7 G/DL (ref 6–8.4)
RBC # BLD AUTO: 4.3 M/UL (ref 4–5.4)
SODIUM SERPL-SCNC: 140 MMOL/L (ref 136–145)
TSH SERPL DL<=0.005 MIU/L-ACNC: 2.61 UIU/ML (ref 0.4–4)
WBC # BLD AUTO: 11.4 K/UL (ref 3.9–12.7)

## 2024-02-19 PROCEDURE — 99204 OFFICE O/P NEW MOD 45 MIN: CPT | Mod: S$GLB,TXP,, | Performed by: INTERNAL MEDICINE

## 2024-02-19 PROCEDURE — 36415 COLL VENOUS BLD VENIPUNCTURE: CPT | Mod: TXP | Performed by: INTERNAL MEDICINE

## 2024-02-19 PROCEDURE — 84443 ASSAY THYROID STIM HORMONE: CPT | Mod: TXP | Performed by: INTERNAL MEDICINE

## 2024-02-19 PROCEDURE — 83880 ASSAY OF NATRIURETIC PEPTIDE: CPT | Mod: TXP | Performed by: INTERNAL MEDICINE

## 2024-02-19 PROCEDURE — 3074F SYST BP LT 130 MM HG: CPT | Mod: CPTII,S$GLB,TXP, | Performed by: INTERNAL MEDICINE

## 2024-02-19 PROCEDURE — 3008F BODY MASS INDEX DOCD: CPT | Mod: CPTII,S$GLB,TXP, | Performed by: INTERNAL MEDICINE

## 2024-02-19 PROCEDURE — 4010F ACE/ARB THERAPY RXD/TAKEN: CPT | Mod: CPTII,S$GLB,TXP, | Performed by: INTERNAL MEDICINE

## 2024-02-19 PROCEDURE — 80053 COMPREHEN METABOLIC PANEL: CPT | Mod: TXP | Performed by: INTERNAL MEDICINE

## 2024-02-19 PROCEDURE — 99999 PR PBB SHADOW E&M-EST. PATIENT-LVL IV: CPT | Mod: PBBFAC,TXP,, | Performed by: INTERNAL MEDICINE

## 2024-02-19 PROCEDURE — 94618 PULMONARY STRESS TESTING: CPT | Mod: NTX,S$GLB,, | Performed by: INTERNAL MEDICINE

## 2024-02-19 PROCEDURE — 3078F DIAST BP <80 MM HG: CPT | Mod: CPTII,S$GLB,TXP, | Performed by: INTERNAL MEDICINE

## 2024-02-19 PROCEDURE — 99999 PR PBB SHADOW E&M-EST. PATIENT-LVL I: CPT | Mod: PBBFAC,TXP,,

## 2024-02-19 PROCEDURE — 85025 COMPLETE CBC W/AUTO DIFF WBC: CPT | Mod: TXP | Performed by: INTERNAL MEDICINE

## 2024-02-19 RX ORDER — METOPROLOL SUCCINATE 25 MG/1
50 TABLET, EXTENDED RELEASE ORAL 2 TIMES DAILY
Qty: 120 TABLET | Refills: 5 | Status: SHIPPED | OUTPATIENT
Start: 2024-02-19

## 2024-02-19 NOTE — Clinical Note
March 11, 2024        Conor Bernardo  [address] [facility] [address]  Phone: [phone]  Fax: [phone]             [id number]  [address]  [facility] [address]  Phone: [phone]   Patient: Siria Villegas   MR Number: [id number]   YOB: 1980   Date of Visit: 2/19/2024       Dear Dr. Conor Bernardo    Thank you for referring Siria Villegas to me for evaluation. Attached you will find relevant portions of my assessment and plan of care.    If you have questions, please do not hesitate to call me. I look forward to following Siria Villegas along with you.    Sincerely,    Lorna Grey MD    Enclosure    If you would like to receive this communication electronically, please contact [email or web address] or [phone] to request Innovate/Protect Link access.    Innovate/Protect Link is a tool which provides read-only access to select patient information with whom you have a relationship. Its easy to use and provides real time access to review your patients record including encounter summaries, notes, results, and demographic information.    If you feel you have received this communication in error or would no longer like to receive these types of communications, please e-mail [email or web address]

## 2024-02-19 NOTE — PROCEDURES
Siria Villegas is a 43 y.o.  female patient, who presents for a 6 minute walk test ordered by MD Que.  The diagnosis is Congenital Heart Disease.  The patient's BMI is 34.7 kg/m2.  Predicted distance (lower limit of normal) is 416.61 meters.      Test Results:    The test was completed without stopping. The total time walked was 360 seconds. During walking, the patient reported:  Dyspnea, Lightheadedness, Calf pain. The patient used no assistive devices during testing.     02/19/2024---------Distance: 426.72 meters (1400 feet)     O2 Sat % Supplemental Oxygen Heart Rate Blood Pressure Constantin Scale   Pre-exercise  (Resting) 98 % Room Air 76 bpm 108/52 mmHg 3   During Exercise 96 % Room Air 104 bpm 115/55 mmHg 9   Post-exercise  (Recovery) 98 % Room Air  86 bpm       Recovery Time: 116 seconds    Performing nurse/tech: ESTHER Wyatt      PREVIOUS STUDY:   The patient has not had a previous study.      CLINICAL INTERPRETATION:  Six minute walk distance is 426.72 meters (1400 feet) with very, very heavy dyspnea.  During exercise, there was no significant desaturation while breathing room air.  Blood pressure remained stable and Heart rate increased significantly with walking.  The patient reported non-pulmonary symptoms during exercise.  No previous study performed.  Based upon age and body mass index, exercise capacity is normal.

## 2024-02-19 NOTE — PROGRESS NOTES
Subjective:   Initial evaluation of advanced heart failure    HPI:  Ms. Villegas is a 43 y.o. year old White female who has presents to be considered for advanced heart failure in setting of congenital heart disease. Last seen by Dr. Bernardo in clinic 01/24 at which time had just had entresto increased and aldactone added. Diuretic dose increased to BID at this most recent visit.Doing better, did well on 6MWT today at 1400 feet (427 meters). Working on EP consult for CRT/ICD eval and sleep clinic eval in .     Per Dr. Bernardo's summary of her ACHD:   1.  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  Reduced systemic right ventricular function with worsening heart failure symptoms  3.  Normal appearing tricuspid valve with mild to moderrate tricuspid insufficiency  4.  History of supraventricular arrhythmias.  Some nonsustained ventricular tachycardia noted on pacemaker evaluation.  5.  Complete heart block with transvenous pacemaker - wide complex     Past Medical History:   Diagnosis Date    Asthma     Congestive heart failure      Past Surgical History:   Procedure Laterality Date    CARDIAC PACEMAKER PLACEMENT  07/26/2018    CYST REMOVAL      age 2 years old, from eyebrow area       Family History   Problem Relation Age of Onset    Cancer Mother     Stroke Mother     Heart failure Mother     Hyperlipidemia Father     Skin cancer Father        Review of Systems   Constitutional: Negative for chills, decreased appetite, diaphoresis, fever, malaise/fatigue, weight gain and weight loss.   Eyes:  Negative for visual disturbance.   Cardiovascular:  Negative for chest pain, claudication, cyanosis, dyspnea on exertion, irregular heartbeat, leg swelling, near-syncope, orthopnea, palpitations, paroxysmal nocturnal dyspnea and syncope.   Respiratory:  Negative for cough, hemoptysis, shortness of breath, sleep disturbances due to breathing, snoring,  "sputum production and wheezing.    Hematologic/Lymphatic: Negative for adenopathy and bleeding problem. Does not bruise/bleed easily.   Skin:  Negative for color change, poor wound healing, rash, skin cancer and suspicious lesions.   Musculoskeletal:  Negative for back pain, falls, gout, joint pain and muscle weakness.   Gastrointestinal:  Negative for bloating, abdominal pain, anorexia, constipation, diarrhea, heartburn, hematemesis, hematochezia, hemorrhoids, melena, nausea and vomiting.   Genitourinary:  Negative for nocturia and urgency.   Neurological:  Negative for excessive daytime sleepiness, dizziness, focal weakness, headaches, light-headedness, paresthesias, tremors and weakness.   Psychiatric/Behavioral:  Negative for depression and memory loss. The patient does not have insomnia and is not nervous/anxious.        Objective:   Blood pressure (!) 117/55, pulse 78, height 5' 8" (1.727 m), weight 105.8 kg (233 lb 4 oz).body mass index is 35.47 kg/m².    Physical Exam  Vitals and nursing note reviewed.   Constitutional:       General: She is not in acute distress.     Appearance: She is well-developed. She is not diaphoretic.   HENT:      Head: Normocephalic and atraumatic.   Eyes:      General:         Right eye: No discharge.         Left eye: No discharge.      Conjunctiva/sclera: Conjunctivae normal.   Neck:      Vascular: No JVD.   Cardiovascular:      Rate and Rhythm: Normal rate and regular rhythm.      Heart sounds: No murmur heard.     No friction rub. No gallop.   Pulmonary:      Effort: Pulmonary effort is normal.      Breath sounds: Normal breath sounds. No wheezing or rales.   Chest:      Chest wall: No tenderness.   Abdominal:      General: Bowel sounds are normal. There is no distension.      Palpations: Abdomen is soft. There is no mass.      Tenderness: There is no abdominal tenderness. There is no guarding or rebound.   Musculoskeletal:         General: No tenderness. Normal range of " motion.      Cervical back: Normal range of motion and neck supple.   Skin:     General: Skin is warm and dry.      Findings: No erythema or rash.   Neurological:      Mental Status: She is alert and oriented to person, place, and time.   Psychiatric:         Behavior: Behavior normal.         Thought Content: Thought content normal.         Judgment: Judgment normal.         Labs:      Chemistry        Component Value Date/Time     01/18/2024 1051    K 4.2 01/18/2024 1051     01/18/2024 1051    CO2 27 01/18/2024 1051    BUN 12 01/18/2024 1051    CREATININE 0.7 01/18/2024 1051    GLU 85 01/18/2024 1051        Component Value Date/Time    CALCIUM 9.3 01/18/2024 1051    ALKPHOS 83 12/06/2023 1559    AST 13 12/06/2023 1559    ALT 9 (L) 12/06/2023 1559    BILITOT 0.3 12/06/2023 1559    ESTGFRAFRICA 162 11/23/2021 1206          Magnesium   Date Value Ref Range Status   10/19/2023 1.9 1.6 - 2.6 mg/dL Final     Lab Results   Component Value Date    WBC 11.40 02/19/2024    HGB 13.0 02/19/2024    HCT 40.2 02/19/2024    MCV 94 02/19/2024     02/19/2024     BNP   Date Value Ref Range Status   12/06/2023 177 (H) 0 - 99 pg/mL Final     Comment:     Values of less than 100 pg/ml are consistent with non-CHF populations.   10/19/2023 204 (H) 0 - 99 pg/mL Final     Comment:     Values of less than 100 pg/ml are consistent with non-CHF populations.   08/13/2023 729 (H) 15 - 111 PG/ML Final     No results found for this or any previous visit.      Labs were reviewed with the patient.    Assessment:      1. Adult congenital heart disease    2. Heart failure due to congenital heart disease    3. Congenitally corrected transposition of great arteries    4. Non-rheumatic tricuspid valve insufficiency    5. Obstructive sleep apnea        Plan:   Will start SGL2i (jardiance) 10mg with repeat labs 1 week.   Additionally increase metoprolol to 50mg po daily.   Refer for sleep clinic evaluation re: sleep apnea.  Would like  to see how she does with med changes, sleep apnea eval after which will see if we can see her again in [place].   Next visit will increase entresto again as tolerated. Vs before, with plan to get repeat echo after that.   Patient is now NYHA III ACC stage C  Recommend 2 gram sodium restriction and 1500cc fluid restriction.  Encourage physical activity with graded exercise program.  Requested patient to weigh themselves daily, and to notify us if their weight increases by more than 3 lbs in 1 day or 5 lbs in 1 week.     Transplant Candidacy: Patient is a 43 y.o. year old female with heart failure is being seen for possible OHT. In my opinion, she is  a suitable OHT candidate. Patient did meet with MCS and/or pre-transplant coordinator at the end of this visit for workup. she is scheduled for medical optimization prior to risk stratification. The patient will follow up with pre-transplant.    Discussed with the patient and family [facility] Mechanical Circulatory Support program outcomes as reported in INTERMACS (Interagency Registry for Mechanically Assisted Circulatory Support):    1 year survival = 92.7%  2 year survival = 86.7%  3 year survival = 86.7%    Patient and family acknowledged receipt of this information and all questions were answered.          Patient advised that it is recommended that all transplant candidates, and their close contacts and household members receive Covid vaccination.    UNOS Patient Status  Functional Status: 50% - Requires considerable assistance and frequent medical care  Physical Capacity: No Limitations  Working for Income: Unknown          Lorna Grey MD

## 2024-02-27 ENCOUNTER — OFFICE VISIT (OUTPATIENT)
Dept: SLEEP MEDICINE | Facility: CLINIC | Age: 44
End: 2024-02-27
Payer: COMMERCIAL

## 2024-02-27 VITALS — WEIGHT: 229 LBS | BODY MASS INDEX: 34.71 KG/M2 | HEIGHT: 68 IN

## 2024-02-27 DIAGNOSIS — F51.04 PSYCHOPHYSIOLOGICAL INSOMNIA: ICD-10-CM

## 2024-02-27 DIAGNOSIS — G47.33 OBSTRUCTIVE SLEEP APNEA: Primary | ICD-10-CM

## 2024-02-27 DIAGNOSIS — E66.9 OBESITY, CLASS I, BMI 30-34.9: ICD-10-CM

## 2024-02-27 PROCEDURE — 4010F ACE/ARB THERAPY RXD/TAKEN: CPT | Mod: CPTII,95,NTX, | Performed by: NURSE PRACTITIONER

## 2024-02-27 PROCEDURE — 1159F MED LIST DOCD IN RCRD: CPT | Mod: CPTII,95,NTX, | Performed by: NURSE PRACTITIONER

## 2024-02-27 PROCEDURE — 99213 OFFICE O/P EST LOW 20 MIN: CPT | Mod: 95,NTX,, | Performed by: NURSE PRACTITIONER

## 2024-02-27 PROCEDURE — 1160F RVW MEDS BY RX/DR IN RCRD: CPT | Mod: CPTII,95,NTX, | Performed by: NURSE PRACTITIONER

## 2024-02-27 PROCEDURE — 3008F BODY MASS INDEX DOCD: CPT | Mod: CPTII,95,NTX, | Performed by: NURSE PRACTITIONER

## 2024-02-27 NOTE — PATIENT INSTRUCTIONS
An order has been placed for AutoPAP machine and has been sent to a medical equipment company. The medical equipment company will send all the needed information to your insurance provider for approval. Shortly, you will be receiving a phone call about scheduling you for AutoPAP set up. If you do not hear from the company within 3 weeks, please make us aware by calling us or by sending a message through the patient portal online. You can also call them directly at   [facility]   Toll free 24 hr line for assistance: [phone]   Option 1: CPAP  (press 1 - supplies ([employer]), press 2 questions regarding your machine)  Option 2: Oxygen, Nebulizer, Ventilator  Option 3: service  Option 4: Discharge (, providers, nurses)  Option 5: Diabetics  Option 6: Ortho (ortho braces, walker, wheelchairs, etc)  Option 7: Billing    You will also need to follow back up in the clinic with your provider in 10 weeks to review compliance of your AutoPAP. Your insurance requires documented compliance (wearing over 4hrs a night). Please bring the AutoPAP with you to the follow up visit.

## 2024-02-27 NOTE — PROGRESS NOTES
"Subjective:      Patient ID: Siria Villegas is a 43 y.o. female.    Chief Complaint: Sleep Apnea (Rev slp study)  The patient location is: [place]  The chief complaint leading to consultation is: sleep apnea  Visit type: Virtual visit with synchronous audio and video  Total time spent with patient: 11 min   Each patient to whom he or she provides medical services by telemedicine is:  (1) informed of the relationship between the physician and patient and the respective role of any other health care provider with respect to management of the patient; and (2) notified that he or she may decline to receive medical services by telemedicine and may withdraw from such care at any time.    HPI  Patient presents for evaluation of sleep apnea.  Patient with snoring. Patient wakes up frequently throughout the night.  Patient does not wake up feeling refreshed in the morning.  Patient with daytime hypersomnolence.  Patient has had symptoms for a few years and worse over the last year. Comorbidities include BMI 35, significant heart disease.   Bedtime: 8 PM  Wake time: 5 AM  Patient Active Problem List   Diagnosis    Adult congenital heart disease    Congenitally corrected transposition of great arteries    Non-rheumatic tricuspid valve insufficiency    Complete heart block    Other fatigue    Paroxysmal atrial fibrillation    Congestive heart failure    Heart failure due to congenital heart disease    Sleep disorder    Obstructive sleep apnea     Ht 5' 8" (1.727 m)   Wt 103.9 kg (229 lb)   BMI 34.82 kg/m²   Body mass index is 34.82 kg/m².    Review of Systems   Constitutional:  Positive for fatigue.   HENT: Negative.     Respiratory:  Positive for snoring and somnolence.    Cardiovascular: Negative.    Musculoskeletal: Negative.    Gastrointestinal: Negative.    Neurological: Negative.    Psychiatric/Behavioral:  Positive for sleep disturbance.      Objective:      Physical Exam  Constitutional:       Appearance: Normal " appearance. She is well-developed.   HENT:      Head: Normocephalic and atraumatic.   Pulmonary:      Effort: Pulmonary effort is normal. No tachypnea, bradypnea, accessory muscle usage or respiratory distress.   Musculoskeletal:      Cervical back: Normal range of motion.   Skin:     Findings: No rash.   Neurological:      Mental Status: She is alert and oriented to person, place, and time.   Psychiatric:         Behavior: Behavior normal.         Thought Content: Thought content normal.         Judgment: Judgment normal.       Personal Diagnostic Review  [place] Sleepiness scale score: 15       Results for orders placed during the hospital encounter of 01/18/24    X-Ray Chest PA And Lateral    Narrative  EXAMINATION:  XR CHEST PA AND LATERAL    CLINICAL HISTORY:  Congenital malformation of heart, unspecified    TECHNIQUE:  PA and lateral views of the chest were performed.    COMPARISON:  None    FINDINGS:  Pacemaker is present on the left with electrodes to right atrium and ventricle.  Abandoned electrodes are present on the right.  The heart size and pulmonary vessels are normal.  Mediastinal contour is normal.  History indicates congenital heart disease.  The lungs are expanded and clear.  No lung consolidation or pleural fluid is detected.  The skeletal structures are intact.    Impression  No acute cardiopulmonary disease      Electronically signed by: Jaycob Chandler MD  Date:    01/18/2024  Time:    11:43        Assessment:       1. Obstructive sleep apnea    2. Psychophysiological insomnia    3. Obesity, Class I, BMI 30-34.9        Outpatient Encounter Medications as of 2/27/2024   Medication Sig Dispense Refill    ALBUTEROL SULFATE INHL albuterol sulfate Take No date recorded No form recorded No frequency recorded No route recorded No set duration recorded No set duration amount recorded active No dosage strength recorded No dosage strength units of measure recorded      aspirin (ECOTRIN) 81 MG EC tablet  Take 81 mg by mouth once daily.       cetirizine (ZYRTEC) 10 MG tablet Take 1 tablet by mouth every morning.      digoxin (LANOXIN) 250 mcg tablet Take 0.5 tablets (125 mcg total) by mouth once daily. 45 tablet 3    empagliflozin (JARDIANCE) 10 mg tablet Take 1 tablet (10 mg total) by mouth once daily. 30 tablet 5    ferrous gluconate (FERGON) 324 MG tablet Take 324 mg by mouth.      fluticasone propionate (FLONASE ALLERGY RELIEF NASL) Flonase Allergy Relief Take No date recorded No form recorded No frequency recorded No route recorded No set duration recorded No set duration amount recorded active No dosage strength recorded No dosage strength units of measure recorded      furosemide (LASIX) 40 MG tablet Take 1 tablet (40 mg total) by mouth 2 (two) times a day. 180 tablet 3    guaifenesin/phenylephrine HCl (MUCINEX COLD ORAL) Mucinex Take No date recorded No form recorded No frequency recorded No route recorded No set duration recorded No set duration amount recorded suspended No dosage strength recorded No dosage strength units of measure recorded      metoprolol succinate (TOPROL-XL) 25 MG 24 hr tablet Take 2 tablets (50 mg total) by mouth 2 (two) times daily. 120 tablet 5    propafenone (RHTHYMOL) 150 MG Tab Take 150 mg by mouth 2 (two) times daily. One tablet in the morning and two at night      sacubitriL-valsartan (ENTRESTO) 49-51 mg per tablet Take 1 tablet by mouth 2 (two) times daily. 60 tablet 11    sertraline (ZOLOFT) 100 MG tablet Take 100 mg by mouth.      spironolactone (ALDACTONE) 25 MG tablet Take 1 tablet (25 mg total) by mouth once daily. 30 tablet 11    TRI-SPRINTEC, 28, 0.18/0.215/0.25 mg-35 mcg (28) tablet Take 1 tablet by mouth once daily.        No facility-administered encounter medications on file as of 2/27/2024.     Orders Placed This Encounter   Procedures    CPAP FOR HOME USE     Order Specific Question:   Length of need (1-99 months):     Answer:   99     Order Specific Question:    Type ():     Answer:   Auto CPAP     Order Specific Question:   Auto CPAP pressure setting range (cmH20):     Answer:   5-15     Order Specific Question:   Fulfillment Priority:     Answer:   Level 4:  all others     Order Specific Question:   Humidification ():     Answer:   Heated     Order Specific Question:   Choose ONE mask type and its corresponding cushions and/or pillows:     Answer:    Nasal Mask, 1 per 90 days:  Nasal Cushions, (6 per 90 days):  Nasal Pillows, (6 per 90 days)     Order Specific Question:   Choose EITHER Heated or Non-Heated Tubjing     Answer:    Non-Heated Tubing, 1 per 90 days     Order Specific Question:   All other supplies as needed as listed below:     Answer:    Headgear, 1 per 180 days     Order Specific Question:   All other supplies as needed as listed below:     Answer:    Disposable Filter, 6 per 90 days     Order Specific Question:   All other supplies as needed as listed below:     Answer:    Non-Disposable Filter, 1 per 180 days     Order Specific Question:   All other supplies as needed as listed below:     Answer:    Humidifier Chamber, 1 per 180 days     Order Specific Question:   All other supplies as needed as listed below:     Answer:    Chin Strap, 1 per 180 days     Plan:   AutoPAP and follow up in 10 weeks with download of data card and review of symptoms.    Problem List Items Addressed This Visit          Other    Obstructive sleep apnea - Primary    Relevant Orders    CPAP FOR HOME USE     Other Visit Diagnoses       Psychophysiological insomnia        Obesity, Class I, BMI 30-34.9            Weight loss                   Elizabeth LeJeune, ACNP, ANP

## 2024-03-04 ENCOUNTER — LAB VISIT (OUTPATIENT)
Dept: LAB | Facility: HOSPITAL | Age: 44
End: 2024-03-04
Attending: INTERNAL MEDICINE
Payer: COMMERCIAL

## 2024-03-04 DIAGNOSIS — I50.9 HEART FAILURE DUE TO CONGENITAL HEART DISEASE: ICD-10-CM

## 2024-03-04 DIAGNOSIS — Q24.9 ADULT CONGENITAL HEART DISEASE: ICD-10-CM

## 2024-03-04 DIAGNOSIS — Q24.9 HEART FAILURE DUE TO CONGENITAL HEART DISEASE: ICD-10-CM

## 2024-03-04 PROCEDURE — 80053 COMPREHEN METABOLIC PANEL: CPT | Mod: TXP | Performed by: INTERNAL MEDICINE

## 2024-03-04 PROCEDURE — 36415 COLL VENOUS BLD VENIPUNCTURE: CPT | Mod: TXP | Performed by: INTERNAL MEDICINE

## 2024-03-05 ENCOUNTER — TELEPHONE (OUTPATIENT)
Dept: TRANSPLANT | Facility: CLINIC | Age: 44
End: 2024-03-05
Payer: COMMERCIAL

## 2024-03-05 LAB
ALBUMIN SERPL BCP-MCNC: 3.4 G/DL (ref 3.5–5.2)
ALP SERPL-CCNC: 79 U/L (ref 55–135)
ALT SERPL W/O P-5'-P-CCNC: 12 U/L (ref 10–44)
ANION GAP SERPL CALC-SCNC: 11 MMOL/L (ref 8–16)
AST SERPL-CCNC: 14 U/L (ref 10–40)
BILIRUB SERPL-MCNC: 0.2 MG/DL (ref 0.1–1)
BUN SERPL-MCNC: 17 MG/DL (ref 6–20)
CALCIUM SERPL-MCNC: 9.6 MG/DL (ref 8.7–10.5)
CHLORIDE SERPL-SCNC: 105 MMOL/L (ref 95–110)
CO2 SERPL-SCNC: 22 MMOL/L (ref 23–29)
CREAT SERPL-MCNC: 0.8 MG/DL (ref 0.5–1.4)
EST. GFR  (NO RACE VARIABLE): >60 ML/MIN/1.73 M^2
GLUCOSE SERPL-MCNC: 81 MG/DL (ref 70–110)
POTASSIUM SERPL-SCNC: 4.3 MMOL/L (ref 3.5–5.1)
PROT SERPL-MCNC: 7.4 G/DL (ref 6–8.4)
SODIUM SERPL-SCNC: 138 MMOL/L (ref 136–145)

## 2024-03-05 NOTE — TELEPHONE ENCOUNTER
Reviewed labs from 3/4/24.  Results are at baseline for pt.     Spoke with pt who reports no adverse side effects after starting jardiance.       Message sent to Dr Grey to advise on plan moving forward.  Pt is not currently scheduled for followup.

## 2024-03-13 ENCOUNTER — PATIENT MESSAGE (OUTPATIENT)
Dept: CARDIOLOGY | Facility: CLINIC | Age: 44
End: 2024-03-13
Payer: COMMERCIAL

## 2024-04-03 ENCOUNTER — TELEPHONE (OUTPATIENT)
Dept: TRANSPLANT | Facility: CLINIC | Age: 44
End: 2024-04-03
Payer: COMMERCIAL

## 2024-04-03 DIAGNOSIS — I50.22 CHRONIC SYSTOLIC CONGESTIVE HEART FAILURE: Primary | ICD-10-CM

## 2024-04-03 NOTE — TELEPHONE ENCOUNTER
----- Message from Lorna Grey MD sent at 4/2/2024  9:05 PM CDT -----  Roberto Choi, regarding this patient, I think she can move over to heart failure for now.  If you could have her set up for an appointment in [place] in the next couple of weeks with me, that would be great.  I have clinic there on April 23rd.  Labs the day before if possible.  She is still actively getting work done from a sleep apnea standpoint, I will plan to increase Entresto 1 more time and then see how her follow-up visit with Dr. Bernardo.    Thank you for your patience with me,   Lorna      ______________    Left voicemail for pt requesting call back to schedule clinic & labs

## 2024-04-04 ENCOUNTER — PATIENT MESSAGE (OUTPATIENT)
Dept: TRANSPLANT | Facility: CLINIC | Age: 44
End: 2024-04-04
Payer: COMMERCIAL

## 2024-04-04 NOTE — TELEPHONE ENCOUNTER
Care of patient is being transferred to CHF section from Preht section, per Dr. Grey.    Dx: congenital CM  Reason:  medically stable, needs further GDMT  Pt is not on home inotrope therapy.    Outstanding orders scheduled:  RTC 4/23 w/Dr Grey in [place] (labs day prior).

## 2024-04-15 ENCOUNTER — PATIENT MESSAGE (OUTPATIENT)
Dept: CARDIOLOGY | Facility: CLINIC | Age: 44
End: 2024-04-15
Payer: COMMERCIAL

## 2024-04-22 ENCOUNTER — LAB VISIT (OUTPATIENT)
Dept: LAB | Facility: HOSPITAL | Age: 44
End: 2024-04-22
Attending: INTERNAL MEDICINE
Payer: COMMERCIAL

## 2024-04-22 DIAGNOSIS — I50.9 HEART FAILURE DUE TO CONGENITAL HEART DISEASE: ICD-10-CM

## 2024-04-22 DIAGNOSIS — Q24.9 ADULT CONGENITAL HEART DISEASE: ICD-10-CM

## 2024-04-22 DIAGNOSIS — I50.22 CHRONIC SYSTOLIC CONGESTIVE HEART FAILURE: ICD-10-CM

## 2024-04-22 DIAGNOSIS — Q24.9 HEART FAILURE DUE TO CONGENITAL HEART DISEASE: ICD-10-CM

## 2024-04-22 LAB
ALBUMIN SERPL BCP-MCNC: 3.2 G/DL (ref 3.5–5.2)
ALP SERPL-CCNC: 82 U/L (ref 55–135)
ALT SERPL W/O P-5'-P-CCNC: 11 U/L (ref 10–44)
ANION GAP SERPL CALC-SCNC: 10 MMOL/L (ref 8–16)
AST SERPL-CCNC: 14 U/L (ref 10–40)
BILIRUB SERPL-MCNC: 0.2 MG/DL (ref 0.1–1)
BNP SERPL-MCNC: 99 PG/ML (ref 0–99)
BUN SERPL-MCNC: 13 MG/DL (ref 6–20)
CALCIUM SERPL-MCNC: 8.5 MG/DL (ref 8.7–10.5)
CHLORIDE SERPL-SCNC: 104 MMOL/L (ref 95–110)
CO2 SERPL-SCNC: 23 MMOL/L (ref 23–29)
CREAT SERPL-MCNC: 0.8 MG/DL (ref 0.5–1.4)
EST. GFR  (NO RACE VARIABLE): >60 ML/MIN/1.73 M^2
GLUCOSE SERPL-MCNC: 101 MG/DL (ref 70–110)
POTASSIUM SERPL-SCNC: 3.7 MMOL/L (ref 3.5–5.1)
PROT SERPL-MCNC: 6.9 G/DL (ref 6–8.4)
SODIUM SERPL-SCNC: 137 MMOL/L (ref 136–145)

## 2024-04-22 PROCEDURE — 36415 COLL VENOUS BLD VENIPUNCTURE: CPT | Performed by: INTERNAL MEDICINE

## 2024-04-22 PROCEDURE — 83880 ASSAY OF NATRIURETIC PEPTIDE: CPT | Performed by: INTERNAL MEDICINE

## 2024-04-22 PROCEDURE — 80053 COMPREHEN METABOLIC PANEL: CPT | Performed by: INTERNAL MEDICINE

## 2024-04-23 ENCOUNTER — OFFICE VISIT (OUTPATIENT)
Dept: TRANSPLANT | Facility: CLINIC | Age: 44
End: 2024-04-23
Payer: COMMERCIAL

## 2024-04-23 VITALS
HEART RATE: 84 BPM | OXYGEN SATURATION: 97 % | SYSTOLIC BLOOD PRESSURE: 110 MMHG | WEIGHT: 236.13 LBS | DIASTOLIC BLOOD PRESSURE: 60 MMHG | BODY MASS INDEX: 35.9 KG/M2

## 2024-04-23 DIAGNOSIS — I36.1 NON-RHEUMATIC TRICUSPID VALVE INSUFFICIENCY: ICD-10-CM

## 2024-04-23 DIAGNOSIS — Q24.9 ADULT CONGENITAL HEART DISEASE: Primary | ICD-10-CM

## 2024-04-23 PROCEDURE — 99214 OFFICE O/P EST MOD 30 MIN: CPT | Mod: S$GLB,,, | Performed by: INTERNAL MEDICINE

## 2024-04-23 PROCEDURE — 1160F RVW MEDS BY RX/DR IN RCRD: CPT | Mod: CPTII,S$GLB,, | Performed by: INTERNAL MEDICINE

## 2024-04-23 PROCEDURE — 99999 PR PBB SHADOW E&M-EST. PATIENT-LVL III: CPT | Mod: PBBFAC,,, | Performed by: INTERNAL MEDICINE

## 2024-04-23 PROCEDURE — 3078F DIAST BP <80 MM HG: CPT | Mod: CPTII,S$GLB,, | Performed by: INTERNAL MEDICINE

## 2024-04-23 PROCEDURE — 3074F SYST BP LT 130 MM HG: CPT | Mod: CPTII,S$GLB,, | Performed by: INTERNAL MEDICINE

## 2024-04-23 PROCEDURE — 1159F MED LIST DOCD IN RCRD: CPT | Mod: CPTII,S$GLB,, | Performed by: INTERNAL MEDICINE

## 2024-04-23 PROCEDURE — 4010F ACE/ARB THERAPY RXD/TAKEN: CPT | Mod: CPTII,S$GLB,, | Performed by: INTERNAL MEDICINE

## 2024-04-23 PROCEDURE — 3008F BODY MASS INDEX DOCD: CPT | Mod: CPTII,S$GLB,, | Performed by: INTERNAL MEDICINE

## 2024-04-23 RX ORDER — SACUBITRIL AND VALSARTAN 97; 103 MG/1; MG/1
1 TABLET, FILM COATED ORAL 2 TIMES DAILY
Qty: 60 TABLET | Refills: 6 | Status: SHIPPED | OUTPATIENT
Start: 2024-04-23

## 2024-04-23 NOTE — Clinical Note
Hello all, can we please schedule ms burrows for cpx, cmp, congenital echo in about 6 weeks? At [facility]. Thank you.   Also- bmp in 7-10 days.

## 2024-04-23 NOTE — PROGRESS NOTES
Subjective:   followup evaluation of advanced heart failure    HPI:  Ms. Villegas is a 43 y.o. year old White female who has presents to be considered for advanced heart failure in setting of congenital heart disease. Last seen by Dr. Bernardo in clinic 01/24 at which time had just had entresto increased and aldactone added. Diuretic dose increased to BID at this most recent visit.Doing better, did well on 6MWT today at 1400 feet (427 meters). Working on EP consult for CRT/ICD eval and sleep clinic eval in .     Per Dr. Bernardo's summary of her ACHD:   1.  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  Reduced systemic right ventricular function with worsening heart failure symptoms  3.  Normal appearing tricuspid valve with mild to moderrate tricuspid insufficiency  4.  History of supraventricular arrhythmias.  Some nonsustained ventricular tachycardia noted on pacemaker evaluation.  5.  Complete heart block with transvenous pacemaker - wide complex     TTE: 01/18/24:  Qualitative impression of mildly dilated right atrium with two pacing wires demonstrated.  Some images suggest that both pacing wires cross the subpulmonary mitral valve to the subpulmonary left ventricle.  There is mild-to-moderate associated subpulmonary mitral insufficiency with no evidence of stenosis.  Qualitative impression normal size and structure of the subpulmonary left ventricle with good systolic function.  Subpulmonary left ventricular pressure estimated 22 mmHg from well-developed Doppler profile.  Normal flow across left ventricular outflow tract and pulmonary valve with no significant pulmonary insufficiency demonstrated.  Normal size confluent pulmonary arteries demonstrated.  The left atrial volume index is mildly enlarged measuring 38 ml/m2 (BMI =35 - visually moderate to severely enlarged).   Systemic tricuspid valve with no evidence of stenosis and mild to  moderate insufficiency.  Severe dilation and mild hypertrophy of the systemic right ventricle.   Paradoxical septal motion with poor movement of the LV free wall and EF estimated 30% from apical views.  Normal flow is demonstrated across the aortic valve with no significant insufficiency.    Normal size aorta with no evidence of coarctation.  No pericardial effusion.      Past Medical History:   Diagnosis Date    Asthma     Congestive heart failure      Past Surgical History:   Procedure Laterality Date    CARDIAC PACEMAKER PLACEMENT  07/26/2018    CYST REMOVAL      age 2 years old, from eyebrow area       Family History   Problem Relation Name Age of Onset    Cancer Mother      Stroke Mother      Heart failure Mother      Hyperlipidemia Father      Skin cancer Father         Review of Systems   Constitutional: Negative for chills, decreased appetite, diaphoresis, fever, malaise/fatigue, weight gain and weight loss.   Eyes:  Negative for visual disturbance.   Cardiovascular:  Negative for chest pain, claudication, cyanosis, dyspnea on exertion, irregular heartbeat, leg swelling, near-syncope, orthopnea, palpitations, paroxysmal nocturnal dyspnea and syncope.   Respiratory:  Negative for cough, hemoptysis, shortness of breath, sleep disturbances due to breathing, snoring, sputum production and wheezing.    Hematologic/Lymphatic: Negative for adenopathy and bleeding problem. Does not bruise/bleed easily.   Skin:  Negative for color change, poor wound healing, rash, skin cancer and suspicious lesions.   Musculoskeletal:  Negative for back pain, falls, gout, joint pain and muscle weakness.   Gastrointestinal:  Negative for bloating, abdominal pain, anorexia, constipation, diarrhea, heartburn, hematemesis, hematochezia, hemorrhoids, melena, nausea and vomiting.   Genitourinary:  Negative for nocturia and urgency.   Neurological:  Negative for excessive daytime sleepiness, dizziness, focal weakness, headaches,  light-headedness, paresthesias, tremors and weakness.   Psychiatric/Behavioral:  Negative for depression and memory loss. The patient does not have insomnia and is not nervous/anxious.        Objective:   Blood pressure 110/60, pulse 84, weight 107.1 kg (236 lb 1.8 oz), SpO2 97%.body mass index is 35.9 kg/m².    Physical Exam  Vitals and nursing note reviewed.   Constitutional:       General: She is not in acute distress.     Appearance: She is well-developed. She is not diaphoretic.   HENT:      Head: Normocephalic and atraumatic.   Eyes:      General:         Right eye: No discharge.         Left eye: No discharge.      Conjunctiva/sclera: Conjunctivae normal.   Neck:      Vascular: No JVD.   Cardiovascular:      Rate and Rhythm: Normal rate and regular rhythm.      Heart sounds: No murmur heard.     No friction rub. No gallop.   Pulmonary:      Effort: Pulmonary effort is normal.      Breath sounds: Normal breath sounds. No wheezing or rales.   Chest:      Chest wall: No tenderness.   Abdominal:      General: Bowel sounds are normal. There is no distension.      Palpations: Abdomen is soft. There is no mass.      Tenderness: There is no abdominal tenderness. There is no guarding or rebound.   Musculoskeletal:         General: No tenderness. Normal range of motion.      Cervical back: Normal range of motion and neck supple.   Skin:     General: Skin is warm and dry.      Findings: No erythema or rash.   Neurological:      Mental Status: She is alert and oriented to person, place, and time.   Psychiatric:         Behavior: Behavior normal.         Thought Content: Thought content normal.         Judgment: Judgment normal.         Labs:      Chemistry        Component Value Date/Time     05/02/2024 1536    K 3.8 05/02/2024 1536     05/02/2024 1536    CO2 23 05/02/2024 1536    BUN 16 05/02/2024 1536    CREATININE 0.8 05/02/2024 1536    GLU 68 (L) 05/02/2024 1536        Component Value Date/Time    CALCIUM  8.9 05/02/2024 1536    ALKPHOS 82 04/22/2024 1510    AST 14 04/22/2024 1510    ALT 11 04/22/2024 1510    BILITOT 0.2 04/22/2024 1510    ESTGFRAFRICA 162 11/23/2021 1206          Magnesium   Date Value Ref Range Status   10/19/2023 1.9 1.6 - 2.6 mg/dL Final     Lab Results   Component Value Date    WBC 11.40 02/19/2024    HGB 13.0 02/19/2024    HCT 40.2 02/19/2024    MCV 94 02/19/2024     02/19/2024     BNP   Date Value Ref Range Status   04/22/2024 99 0 - 99 pg/mL Final     Comment:     Values of less than 100 pg/ml are consistent with non-CHF populations.   02/19/2024 159 (H) 0 - 99 pg/mL Final     Comment:     Values of less than 100 pg/ml are consistent with non-CHF populations.   12/06/2023 177 (H) 0 - 99 pg/mL Final     Comment:     Values of less than 100 pg/ml are consistent with non-CHF populations.     No results found for this or any previous visit.      Labs were reviewed with the patient.    Assessment:      1. Adult congenital heart disease    2. Non-rheumatic tricuspid valve insufficiency          Plan:   Increase entresto to 97/103, repeat labs 1 week.  Cpx echo in 2-3 months.   Seems to be doing well overall. Moved to high risk heart failure list already.   Patient is now NYHA III ACC stage C  Recommend 2 gram sodium restriction and 1500cc fluid restriction.  Encourage physical activity with graded exercise program.  Requested patient to weigh themselves daily, and to notify us if their weight increases by more than 3 lbs in 1 day or 5 lbs in 1 week.       Lorna Grey MD

## 2024-04-23 NOTE — Clinical Note
April 23, 2024        Conor Bernardo  [address]  [place] [address]  Phone: [phone]  Fax: [phone]             [facility]  [address] [place] [address]  Phone: [phone]  Fax: [phone]   Patient: Siria Villegas   MR Number: [id number]   YOB: 1980   Date of Visit: 4/23/2024       Dear Dr. Conor Bernardo    Thank you for referring Siria Villegas to me for evaluation. Attached you will find relevant portions of my assessment and plan of care.    If you have questions, please do not hesitate to call me. I look forward to following Siria Villegas along with you.    Sincerely,    Lorna Grey MD    Enclosure    If you would like to receive this communication electronically, please contact [email or web address] or [phone] to request [employer] Link access.    [employer] Link is a tool which provides read-only access to select patient information with whom you have a relationship. Its easy to use and provides real time access to review your patients record including encounter summaries, notes, results, and demographic information.    If you feel you have received this communication in error or would no longer like to receive these types of communications, please e-mail [email or web address]

## 2024-05-02 ENCOUNTER — LAB VISIT (OUTPATIENT)
Dept: LAB | Facility: HOSPITAL | Age: 44
End: 2024-05-02
Attending: INTERNAL MEDICINE
Payer: COMMERCIAL

## 2024-05-02 ENCOUNTER — PATIENT MESSAGE (OUTPATIENT)
Dept: TRANSPLANT | Facility: CLINIC | Age: 44
End: 2024-05-02
Payer: COMMERCIAL

## 2024-05-02 DIAGNOSIS — Q24.9 ADULT CONGENITAL HEART DISEASE: ICD-10-CM

## 2024-05-02 DIAGNOSIS — I36.1 NON-RHEUMATIC TRICUSPID VALVE INSUFFICIENCY: ICD-10-CM

## 2024-05-02 PROCEDURE — 80048 BASIC METABOLIC PNL TOTAL CA: CPT | Performed by: INTERNAL MEDICINE

## 2024-05-02 PROCEDURE — 83880 ASSAY OF NATRIURETIC PEPTIDE: CPT | Performed by: INTERNAL MEDICINE

## 2024-05-03 ENCOUNTER — TELEPHONE (OUTPATIENT)
Dept: TRANSPLANT | Facility: CLINIC | Age: 44
End: 2024-05-03
Payer: COMMERCIAL

## 2024-05-03 LAB
ANION GAP SERPL CALC-SCNC: 10 MMOL/L (ref 8–16)
BUN SERPL-MCNC: 16 MG/DL (ref 6–20)
CALCIUM SERPL-MCNC: 8.9 MG/DL (ref 8.7–10.5)
CHLORIDE SERPL-SCNC: 103 MMOL/L (ref 95–110)
CO2 SERPL-SCNC: 23 MMOL/L (ref 23–29)
CREAT SERPL-MCNC: 0.8 MG/DL (ref 0.5–1.4)
EST. GFR  (NO RACE VARIABLE): >60 ML/MIN/1.73 M^2
GLUCOSE SERPL-MCNC: 68 MG/DL (ref 70–110)
POTASSIUM SERPL-SCNC: 3.8 MMOL/L (ref 3.5–5.1)
SODIUM SERPL-SCNC: 136 MMOL/L (ref 136–145)

## 2024-05-03 NOTE — TELEPHONE ENCOUNTER
5/3/24 - Received lab results from yesterday, Na/K+ 136/3.8, BUN/Cr - 16/0.8.  Reason for lab is patient was started on Jardiance 10 mg daily and metoprolol 50 mg daily and increased Entresto to  mg BID.    Called/spoke with patient who stated she is feeling good, however she reports being fatigued since increasing Entresto to  mg BID, but she stated Dr. Grey did warn her of this.  I instructed patient, yes this is a side effect and it usually works itself out, to give it a couple of weeks.  If it should continue give us a call back.      Patient verbalized understanding and said she would continue taking her medications as prescribed.

## 2024-05-04 LAB — NT-PROBNP SERPL IA-MCNC: 340 PG/ML

## 2024-07-15 ENCOUNTER — HOSPITAL ENCOUNTER (OUTPATIENT)
Dept: CARDIOLOGY | Facility: HOSPITAL | Age: 44
Discharge: HOME | End: 2024-07-15
Attending: INTERNAL MEDICINE
Payer: COMMERCIAL

## 2024-07-15 VITALS
HEIGHT: 68 IN | WEIGHT: 235 LBS | DIASTOLIC BLOOD PRESSURE: 55 MMHG | SYSTOLIC BLOOD PRESSURE: 90 MMHG | BODY MASS INDEX: 35.61 KG/M2 | HEART RATE: 83 BPM

## 2024-07-15 DIAGNOSIS — I36.1 NON-RHEUMATIC TRICUSPID VALVE INSUFFICIENCY: ICD-10-CM

## 2024-07-15 DIAGNOSIS — Q24.9 ADULT CONGENITAL HEART DISEASE: ICD-10-CM

## 2024-07-15 LAB
CV STRESS BASE HR: 83 BPM
DIASTOLIC BLOOD PRESSURE: 55 MMHG
OHS CV CPX 1 MINUTE RECOVERY HEART RATE: 123 BPM
OHS CV CPX 85 PERCENT MAX PREDICTED HEART RATE MALE: 151
OHS CV CPX DATA GRADE - PEAK: 10.4
OHS CV CPX DATA O2 SAT - PEAK: 96
OHS CV CPX DATA O2 SAT - REST: 96
OHS CV CPX DATA SPEED - PEAK: 3
OHS CV CPX DATA TIME - PEAK: 5
OHS CV CPX DATA VE/VCO2 - PEAK: 32
OHS CV CPX DATA VE/VO2 - PEAK: 26
OHS CV CPX DATA VO2 - PEAK: 15.2
OHS CV CPX DATA VO2 - REST: 4.4
OHS CV CPX FEV1/FVC: 0.9
OHS CV CPX FORCED EXPIRATORY VOLUME: 2.68
OHS CV CPX FORCED VITAL CAPACITY (FVC): 2.99
OHS CV CPX HIGHEST VO: 33.3
OHS CV CPX MAX PREDICTED HEART RATE: 178
OHS CV CPX MAXIMAL VOLUNTARY VENTILATION (MVV) PREDICTED: 107.2
OHS CV CPX MAXIMAL VOLUNTARY VENTILATION (MVV): 80
OHS CV CPX MAXIUMUM EXERCISE VENTILATION (VE MAX): 44.1
OHS CV CPX PATIENT AGE: 42
OHS CV CPX PATIENT HEIGHT IN: 68
OHS CV CPX PATIENT IS FEMALE AGE 11-19: 0
OHS CV CPX PATIENT IS FEMALE AGE GREATER THAN 19: 1
OHS CV CPX PATIENT IS FEMALE AGE LESS THAN 11: 0
OHS CV CPX PATIENT IS FEMALE: 1
OHS CV CPX PATIENT IS MALE AGE 11-25: 0
OHS CV CPX PATIENT IS MALE AGE GREATER THAN 25: 0
OHS CV CPX PATIENT IS MALE AGE LESS THAN 11: 0
OHS CV CPX PATIENT IS MALE GREATER THAN 18: 0
OHS CV CPX PATIENT IS MALE LESS THAN OR EQUAL TO 18: 0
OHS CV CPX PATIENT IS MALE: 0
OHS CV CPX PATIENT WEIGHT RETURNED IN OZ: 3760
OHS CV CPX PEAK DIASTOLIC BLOOD PRESSURE: 68 MMHG
OHS CV CPX PEAK HEAR RATE: 131 BPM
OHS CV CPX PEAK RATE PRESSURE PRODUCT: NORMAL
OHS CV CPX PEAK SYSTOLIC BLOOD PRESSURE: 121 MMHG
OHS CV CPX PERCENT BODY FAT: 33.9
OHS CV CPX PERCENT MAX PREDICTED HEART RATE ACHIEVED: 77
OHS CV CPX PREDICTED VO2: 33.3 ML/KG/MIN
OHS CV CPX RATE PRESSURE PRODUCT PRESENTING: 7470
OHS CV CPX REST PET CO2: 29
OHS CV CPX VE/VCO2 SLOPE: 29.5
STRESS ECHO POST EXERCISE DUR MIN: 5 MINUTES
STRESS ECHO POST EXERCISE DUR SEC: 0 SECONDS
SYSTOLIC BLOOD PRESSURE: 90 MMHG

## 2024-07-15 PROCEDURE — 94621 CARDIOPULM EXERCISE TESTING: CPT | Mod: 26,,, | Performed by: INTERNAL MEDICINE

## 2024-07-15 PROCEDURE — 94621 CARDIOPULM EXERCISE TESTING: CPT

## 2024-07-21 RX ORDER — EMPAGLIFLOZIN 10 MG/1
10 TABLET, FILM COATED ORAL
Qty: 30 TABLET | Refills: 5 | Status: SHIPPED | OUTPATIENT
Start: 2024-07-21

## 2024-08-02 DIAGNOSIS — Q20.5 CONGENITALLY CORRECTED TRANSPOSITION OF GREAT ARTERIES: ICD-10-CM

## 2024-08-02 RX ORDER — DIGOXIN 250 MCG
125 TABLET ORAL DAILY
Qty: 45 TABLET | Refills: 0 | Status: SHIPPED | OUTPATIENT
Start: 2024-08-02 | End: 2024-10-31

## 2024-08-12 RX ORDER — METOPROLOL SUCCINATE 25 MG/1
TABLET, EXTENDED RELEASE ORAL
Qty: 120 TABLET | Refills: 5 | Status: SHIPPED | OUTPATIENT
Start: 2024-08-12

## 2024-11-05 ENCOUNTER — OFFICE VISIT (OUTPATIENT)
Dept: TRANSPLANT | Facility: CLINIC | Age: 44
End: 2024-11-05
Payer: COMMERCIAL

## 2024-11-05 ENCOUNTER — LAB VISIT (OUTPATIENT)
Dept: LAB | Facility: HOSPITAL | Age: 44
End: 2024-11-05
Attending: INTERNAL MEDICINE
Payer: COMMERCIAL

## 2024-11-05 VITALS
DIASTOLIC BLOOD PRESSURE: 53 MMHG | WEIGHT: 233.25 LBS | SYSTOLIC BLOOD PRESSURE: 89 MMHG | OXYGEN SATURATION: 95 % | BODY MASS INDEX: 35.35 KG/M2 | HEIGHT: 68 IN | HEART RATE: 80 BPM

## 2024-11-05 DIAGNOSIS — I50.9 HEART FAILURE DUE TO CONGENITAL HEART DISEASE: Primary | ICD-10-CM

## 2024-11-05 DIAGNOSIS — I36.1 NON-RHEUMATIC TRICUSPID VALVE INSUFFICIENCY: ICD-10-CM

## 2024-11-05 DIAGNOSIS — Q24.9 ADULT CONGENITAL HEART DISEASE: ICD-10-CM

## 2024-11-05 DIAGNOSIS — Q24.9 HEART FAILURE DUE TO CONGENITAL HEART DISEASE: Primary | ICD-10-CM

## 2024-11-05 DIAGNOSIS — G47.33 OBSTRUCTIVE SLEEP APNEA: ICD-10-CM

## 2024-11-05 DIAGNOSIS — Q20.5 CONGENITALLY CORRECTED TRANSPOSITION OF GREAT ARTERIES: ICD-10-CM

## 2024-11-05 LAB
ALBUMIN SERPL BCP-MCNC: 3.3 G/DL (ref 3.5–5.2)
ALP SERPL-CCNC: 99 U/L (ref 40–150)
ALT SERPL W/O P-5'-P-CCNC: 10 U/L (ref 10–44)
ANION GAP SERPL CALC-SCNC: 9 MMOL/L (ref 8–16)
AST SERPL-CCNC: 12 U/L (ref 10–40)
BILIRUB SERPL-MCNC: 0.3 MG/DL (ref 0.1–1)
BUN SERPL-MCNC: 12 MG/DL (ref 6–20)
CALCIUM SERPL-MCNC: 9.1 MG/DL (ref 8.7–10.5)
CHLORIDE SERPL-SCNC: 105 MMOL/L (ref 95–110)
CO2 SERPL-SCNC: 25 MMOL/L (ref 23–29)
CREAT SERPL-MCNC: 0.7 MG/DL (ref 0.5–1.4)
EST. GFR  (NO RACE VARIABLE): >60 ML/MIN/1.73 M^2
GLUCOSE SERPL-MCNC: 89 MG/DL (ref 70–110)
POTASSIUM SERPL-SCNC: 3.8 MMOL/L (ref 3.5–5.1)
PROT SERPL-MCNC: 7.4 G/DL (ref 6–8.4)
SODIUM SERPL-SCNC: 139 MMOL/L (ref 136–145)

## 2024-11-05 PROCEDURE — 3078F DIAST BP <80 MM HG: CPT | Mod: CPTII,S$GLB,, | Performed by: INTERNAL MEDICINE

## 2024-11-05 PROCEDURE — 3074F SYST BP LT 130 MM HG: CPT | Mod: CPTII,S$GLB,, | Performed by: INTERNAL MEDICINE

## 2024-11-05 PROCEDURE — 83880 ASSAY OF NATRIURETIC PEPTIDE: CPT | Performed by: INTERNAL MEDICINE

## 2024-11-05 PROCEDURE — 3008F BODY MASS INDEX DOCD: CPT | Mod: CPTII,S$GLB,, | Performed by: INTERNAL MEDICINE

## 2024-11-05 PROCEDURE — 99215 OFFICE O/P EST HI 40 MIN: CPT | Mod: S$GLB,,, | Performed by: INTERNAL MEDICINE

## 2024-11-05 PROCEDURE — 99999 PR PBB SHADOW E&M-EST. PATIENT-LVL III: CPT | Mod: PBBFAC,,, | Performed by: INTERNAL MEDICINE

## 2024-11-05 PROCEDURE — 4010F ACE/ARB THERAPY RXD/TAKEN: CPT | Mod: CPTII,S$GLB,, | Performed by: INTERNAL MEDICINE

## 2024-11-05 PROCEDURE — 80053 COMPREHEN METABOLIC PANEL: CPT | Performed by: INTERNAL MEDICINE

## 2024-11-05 RX ORDER — SACUBITRIL AND VALSARTAN 97; 103 MG/1; MG/1
1 TABLET, FILM COATED ORAL 2 TIMES DAILY
Qty: 60 TABLET | Refills: 3 | Status: SHIPPED | OUTPATIENT
Start: 2024-11-05

## 2024-11-05 NOTE — PROGRESS NOTES
Subjective:   followup evaluation of advanced heart failure    HPI:  Ms. Villegas is a 43 y.o. year old White female who has presents to be considered for advanced heart failure in setting of congenital heart disease. Last seen by Dr. Bernardo in clinic 01/24 at which time had just had entresto increased and aldactone added. Since then diuretics adjusted, SGLT2i and ARNI uptitrated. She additionally since her last visit with me got fitted with cpap mask and states he is feeling great, brain fog has lifted, is exercising 3 x a week, holding herself and her friend accountable, both working towards healthy goals. Patient denies N/V/F/C, lightheadedness, dizziness, PND, orthopnea, LE edema, abdominal pain, abdominal pressure, chest pain, chest pressure, syncope, pre-syncope.  Non-cardiac symptom of tender spot in mid chest that is exacerbated with deep breath, but feels it is maybe related to exercise. Of note, has had discussion regarding lead extraction at [facility], states this was raised in August, not sure what outcome of conversation has been since then.     Per Dr. Bernardo's summary of her ACHD:   1.  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  Reduced systemic right ventricular function with worsening heart failure symptoms  3.  Normal appearing tricuspid valve with mild to moderrate tricuspid insufficiency  4.  History of supraventricular arrhythmias.  Some nonsustained ventricular tachycardia noted on pacemaker evaluation.  5.  Complete heart block with transvenous pacemaker - wide complex     CPX:   Resting spirometry reveals an FVC = 2.99L which is 79% of predicted, an FEV1 of 2.68L, which is 87% of predicted and an FEV1/FVC ratio of 90%. The MVV = 107 L/min, which is 97% of predicted.   The respiratory exchange ratio (RER) was 0.9, suggesting poor effort.   The breathing reserve is calculated at 59%, which is normal. Oxygen saturation  with exercise remained normal.   The peak VO2 was 15.2 ml/kg/min which is 46% of predicted equating to a functional capacity of 4.34 METS indicating severe functional impairment.   The anaerobic threshold was not attained.   The peak VO2 Lean was 23.15 ml/kg of lean body weight/min indicating a good prognosis in heart failure.   The VE/VCO2 [place] was 29.5. The Resting PetCO2 was 29.0.   Severe functional impairment associated with a normal breathing reserve, normal oxygen stauration, poor effort. These findings are indicative of functional impairment secondary to poor effort.     TTE: 01/18/24:  Qualitative impression of mildly dilated right atrium with two pacing wires demonstrated.  Some images suggest that both pacing wires cross the subpulmonary mitral valve to the subpulmonary left ventricle.  There is mild-to-moderate associated subpulmonary mitral insufficiency with no evidence of stenosis.  Qualitative impression normal size and structure of the subpulmonary left ventricle with good systolic function.  Subpulmonary left ventricular pressure estimated 22 mmHg from well-developed Doppler profile.  Normal flow across left ventricular outflow tract and pulmonary valve with no significant pulmonary insufficiency demonstrated.  Normal size confluent pulmonary arteries demonstrated.  The left atrial volume index is mildly enlarged measuring 38 ml/m2 (BMI =35 - visually moderate to severely enlarged).   Systemic tricuspid valve with no evidence of stenosis and mild to moderate insufficiency.  Severe dilation and mild hypertrophy of the systemic right ventricle.   Paradoxical septal motion with poor movement of the LV free wall and EF estimated 30% from apical views.  Normal flow is demonstrated across the aortic valve with no significant insufficiency.    Normal size aorta with no evidence of coarctation.  No pericardial effusion.      Past Medical History:   Diagnosis Date    Asthma     Congestive heart failure   "    Past Surgical History:   Procedure Laterality Date    CARDIAC PACEMAKER PLACEMENT  07/26/2018    CYST REMOVAL      age 2 years old, from eyebrow area       Family History   Problem Relation Name Age of Onset    Cancer Mother      Stroke Mother      Heart failure Mother      Hyperlipidemia Father      Skin cancer Father         Review of Systems   Constitutional: Negative for chills, decreased appetite, diaphoresis, fever, malaise/fatigue, weight gain and weight loss.   Eyes:  Negative for visual disturbance.   Cardiovascular:  Negative for chest pain, claudication, cyanosis, dyspnea on exertion, irregular heartbeat, leg swelling, near-syncope, orthopnea, palpitations, paroxysmal nocturnal dyspnea and syncope.   Respiratory:  Negative for cough, hemoptysis, shortness of breath, sleep disturbances due to breathing, snoring, sputum production and wheezing.    Hematologic/Lymphatic: Negative for adenopathy and bleeding problem. Does not bruise/bleed easily.   Skin:  Negative for color change, poor wound healing, rash, skin cancer and suspicious lesions.   Musculoskeletal:  Negative for back pain, falls, gout, joint pain and muscle weakness.   Gastrointestinal:  Negative for bloating, abdominal pain, anorexia, constipation, diarrhea, heartburn, hematemesis, hematochezia, hemorrhoids, melena, nausea and vomiting.   Genitourinary:  Negative for nocturia and urgency.   Neurological:  Negative for excessive daytime sleepiness, dizziness, focal weakness, headaches, light-headedness, paresthesias, tremors and weakness.   Psychiatric/Behavioral:  Negative for depression and memory loss. The patient does not have insomnia and is not nervous/anxious.        Objective:   Blood pressure (!) 89/53, pulse 80, height 5' 8" (1.727 m), weight 105.8 kg (233 lb 4 oz), SpO2 95%.body mass index is 35.47 kg/m².    Physical Exam  Vitals and nursing note reviewed.   Constitutional:       General: She is not in acute distress.     " Appearance: She is well-developed. She is not diaphoretic.   HENT:      Head: Normocephalic and atraumatic.   Eyes:      General:         Right eye: No discharge.         Left eye: No discharge.      Conjunctiva/sclera: Conjunctivae normal.   Neck:      Vascular: No JVD.   Cardiovascular:      Rate and Rhythm: Normal rate and regular rhythm.      Heart sounds: No murmur heard.     No friction rub. No gallop.   Pulmonary:      Effort: Pulmonary effort is normal.      Breath sounds: Normal breath sounds. No wheezing or rales.   Chest:      Chest wall: No tenderness.   Abdominal:      General: Bowel sounds are normal. There is no distension.      Palpations: Abdomen is soft. There is no mass.      Tenderness: There is no abdominal tenderness. There is no guarding or rebound.   Musculoskeletal:         General: No tenderness. Normal range of motion.      Cervical back: Normal range of motion and neck supple.   Skin:     General: Skin is warm and dry.      Findings: No erythema or rash.   Neurological:      Mental Status: She is alert and oriented to person, place, and time.   Psychiatric:         Behavior: Behavior normal.         Thought Content: Thought content normal.         Judgment: Judgment normal.         Labs:      Chemistry        Component Value Date/Time     07/15/2024 1100    K 4.3 07/15/2024 1100     07/15/2024 1100    CO2 24 07/15/2024 1100    BUN 13 07/15/2024 1100    CREATININE 0.8 07/15/2024 1100    GLU 82 07/15/2024 1100        Component Value Date/Time    CALCIUM 8.9 07/15/2024 1100    ALKPHOS 89 07/15/2024 1100    AST 11 07/15/2024 1100    ALT 9 (L) 07/15/2024 1100    BILITOT 0.3 07/15/2024 1100    ESTGFRAFRICA 162 11/23/2021 1206          Magnesium   Date Value Ref Range Status   10/19/2023 1.9 1.6 - 2.6 mg/dL Final     Lab Results   Component Value Date    WBC 11.40 02/19/2024    HGB 13.0 02/19/2024    HCT 40.2 02/19/2024    MCV 94 02/19/2024     02/19/2024     BNP   Date Value  Ref Range Status   04/22/2024 99 0 - 99 pg/mL Final     Comment:     Values of less than 100 pg/ml are consistent with non-CHF populations.   02/19/2024 159 (H) 0 - 99 pg/mL Final     Comment:     Values of less than 100 pg/ml are consistent with non-CHF populations.   12/06/2023 177 (H) 0 - 99 pg/mL Final     Comment:     Values of less than 100 pg/ml are consistent with non-CHF populations.     No results found for this or any previous visit.      Labs were reviewed with the patient.    Assessment:      1. Heart failure due to congenital heart disease    2. Non-rheumatic tricuspid valve insufficiency    3. Congenitally corrected transposition of great arteries    4. Adult congenital heart disease    5. Obstructive sleep apnea          Plan:   Great to see how she is doing, and addition of cpap machine must have helped quite a bit with some of her other symptoms she was experiencing previously.  Would like to keep same regimen including entresto 97/103, toprol XL, aldactone, jardiance.  For her weight did ask about SGLP1, discussed likely not an issue for our medications and to talk to her primary care physician.   Will reach out to Dr. Bernardo regarding lead extraction possibility.  Lastly, will see if she is going to be having a followup with ACHD clinic anytime soon, if so, would like to get repeat echo as last echo is January, prior to medication uptitrations.    Patient is now NYHA II ACC stage B  Recommend 2 gram sodium restriction and 1500cc fluid restriction.  Encourage physical activity with graded exercise program.  Requested patient to weigh themselves daily, and to notify us if their weight increases by more than 3 lbs in 1 day or 5 lbs in 1 week.   RTC 6 months with us, will get labs from today as we don't have any recent ones.     Lorna Grey MD

## 2024-11-05 NOTE — LETTER
November 5, 2024        Conor Bernardo  [address]  [place] [address]  Phone: [phone]  Fax: [phone]             [facility]  [address] [place] [address]  Phone: [phone]  Fax: [phone]   Patient: Siria Villegas   MR Number: [id number]   YOB: 1980   Date of Visit: 11/5/2024       Dear Dr. Conor Bernardo    Thank you for referring Siria Villegas to me for evaluation. Attached you will find relevant portions of my assessment and plan of care.    If you have questions, please do not hesitate to call me. I look forward to following Siria Villegas along with you.    Sincerely,    Lorna Grey MD    Enclosure    If you would like to receive this communication electronically, please contact [email or web address] or [phone] to request [employer] Link access.    [employer] Link is a tool which provides read-only access to select patient information with whom you have a relationship. Its easy to use and provides real time access to review your patients record including encounter summaries, notes, results, and demographic information.    If you feel you have received this communication in error or would no longer like to receive these types of communications, please e-mail [email or web address]

## 2024-11-07 ENCOUNTER — OFFICE VISIT (OUTPATIENT)
Dept: PULMONOLOGY | Facility: CLINIC | Age: 44
End: 2024-11-07
Payer: COMMERCIAL

## 2024-11-07 VITALS
SYSTOLIC BLOOD PRESSURE: 128 MMHG | HEART RATE: 82 BPM | HEIGHT: 68 IN | WEIGHT: 233.94 LBS | DIASTOLIC BLOOD PRESSURE: 64 MMHG | RESPIRATION RATE: 20 BRPM | OXYGEN SATURATION: 97 % | BODY MASS INDEX: 35.45 KG/M2

## 2024-11-07 DIAGNOSIS — E66.01 SEVERE OBESITY (BMI 35.0-39.9) WITH COMORBIDITY: ICD-10-CM

## 2024-11-07 DIAGNOSIS — G47.33 OBSTRUCTIVE SLEEP APNEA: Primary | ICD-10-CM

## 2024-11-07 DIAGNOSIS — I50.22 CHRONIC SYSTOLIC CONGESTIVE HEART FAILURE: ICD-10-CM

## 2024-11-07 LAB — NT-PROBNP SERPL IA-MCNC: 339 PG/ML

## 2024-11-07 PROCEDURE — 1159F MED LIST DOCD IN RCRD: CPT | Mod: CPTII,S$GLB,, | Performed by: NURSE PRACTITIONER

## 2024-11-07 PROCEDURE — 99999 PR PBB SHADOW E&M-EST. PATIENT-LVL V: CPT | Mod: PBBFAC,,, | Performed by: NURSE PRACTITIONER

## 2024-11-07 PROCEDURE — 3008F BODY MASS INDEX DOCD: CPT | Mod: CPTII,S$GLB,, | Performed by: NURSE PRACTITIONER

## 2024-11-07 PROCEDURE — 4010F ACE/ARB THERAPY RXD/TAKEN: CPT | Mod: CPTII,S$GLB,, | Performed by: NURSE PRACTITIONER

## 2024-11-07 PROCEDURE — 3078F DIAST BP <80 MM HG: CPT | Mod: CPTII,S$GLB,, | Performed by: NURSE PRACTITIONER

## 2024-11-07 PROCEDURE — 1160F RVW MEDS BY RX/DR IN RCRD: CPT | Mod: CPTII,S$GLB,, | Performed by: NURSE PRACTITIONER

## 2024-11-07 PROCEDURE — 3074F SYST BP LT 130 MM HG: CPT | Mod: CPTII,S$GLB,, | Performed by: NURSE PRACTITIONER

## 2024-11-07 PROCEDURE — 99213 OFFICE O/P EST LOW 20 MIN: CPT | Mod: S$GLB,,, | Performed by: NURSE PRACTITIONER

## 2024-11-07 NOTE — PROGRESS NOTES
"Subjective:      Patient ID: Siria Villegas is a 43 y.o. female.    Chief Complaint: Follow-up and Sleep Apnea    HPI    Presents for sleep apnea on AutoPAP therapy. Patient states improved symptoms with use of AutoPAP. Sleeping more soundly. Waking up feeling more refreshed. Improved daytime sleepiness. Patient states she is benefiting from use of the AutoPAP. Improved memory and no more brain fog.      Patient Active Problem List   Diagnosis    Adult congenital heart disease    Congenitally corrected transposition of great arteries    Non-rheumatic tricuspid valve insufficiency    Complete heart block    Other fatigue    Paroxysmal atrial fibrillation    Congestive heart failure    Heart failure due to congenital heart disease    Sleep disorder    Obstructive sleep apnea     /64 (BP Location: Right arm, Patient Position: Sitting)   Pulse 82   Resp 20   Ht 5' 8" (1.727 m)   Wt 106.1 kg (233 lb 14.5 oz)   SpO2 97%   BMI 35.57 kg/m²   Body mass index is 35.57 kg/m².    Review of Systems   Constitutional: Negative.    HENT: Negative.     Respiratory: Negative.     Cardiovascular: Negative.    Musculoskeletal: Negative.    Gastrointestinal: Negative.    Neurological: Negative.    Psychiatric/Behavioral: Negative.       Objective:      Physical Exam  Constitutional:       Appearance: Normal appearance. She is well-developed. She is obese.   HENT:      Head: Normocephalic and atraumatic.      Nose: Nose normal.   Cardiovascular:      Rate and Rhythm: Normal rate and regular rhythm.      Heart sounds: No murmur heard.     No gallop.   Pulmonary:      Effort: Pulmonary effort is normal.      Breath sounds: Normal breath sounds.   Abdominal:      Palpations: Abdomen is soft.      Tenderness: There is no abdominal tenderness.   Musculoskeletal:         General: Normal range of motion.      Cervical back: Normal range of motion and neck supple.   Skin:     General: Skin is warm and dry.   Neurological:      " Mental Status: She is alert and oriented to person, place, and time.   Psychiatric:         Mood and Affect: Mood normal.         Behavior: Behavior normal.       Personal Diagnostic Review      11/7/2024     3:20 PM   EPWORTH SLEEPINESS SCALE   Sitting and reading 1   Watching TV 1   Sitting, inactive in a public place (e.g. a theatre or a meeting) 1   As a passenger in a car for an hour without a break 1   Lying down to rest in the afternoon when circumstances permit 3   Sitting and talking to someone 1   Sitting quietly after a lunch without alcohol 3   In a car, while stopped for a few minutes in traffic 0   Total score 11        Initial compliance period 07/30/2024 - 08/28/2024 Compliance met Yes Compliance percentage 70% Payor Standard Usage 07/30/2024 - 08/28/2024 Usage days 21/30 days (70%) >= 4 hours 21 days (70%) < 4 hours 0 days (0%) Usage hours 164 hours 33 minutes Average usage (total days) 5 hours 29 minutes Average usage (days used) 7 hours 50 minutes Median usage (days used) 8 hours 10 minutes Total used hours (value since last reset - 08/28/2024) 165 hours AirSense 11 AutoSet Serial number [id number] Mode AutoSet Min Pressure 5 cmH2O Max Pressure 15 cmH2O EPR Ramp Only EPR level 2 Response Standard Therapy Pressure - cmH2O Median: 7.1 95th percentile: 10.3 Maximum: 11.5 Leaks - L/min Median: 0.0 95th percentile: 4.7 Maximum: 22.2 Events per hour AI: 0.7 HI: 0.1 AHI: 0.8 Apnea Index Central: 0.0 Obstructive: 0.7 Unknown: 0.0 RERA Index 0.0     Usage 10/07/2024 - 11/05/2024  Usage days 29/30 days (97%)  >= 4 hours 29 days (97%)  < 4 hours 0 days (0%)  Usage hours 232 hours 52 minutes  Average usage (total days) 7 hours 46 minutes  Average usage (days used) 8 hours 2 minutes  Median usage (days used) 7 hours 55 minutes  Total used hours (value since last reset - 11/05/2024) 552 hours  AirSense 11 AutoSet  Serial number [id number]  Mode AutoSet  Min Pressure 5 cmH2O  Max Pressure 15 cmH2O  EPR Ramp  Only  EPR level 2  Response Standard  Therapy  Pressure - cmH2O Median: 7.2 95th percentile: 11.3 Maximum: 12.6  Leaks - L/min Median: 0.5 95th percentile: 24.4 Maximum: 38.0  Events per hour AI: 0.9 HI: 0.2 AHI: 1.1  Apnea Index Central: 0.0 Obstructive: 0.8 Unknown: 0.0  RERA Index 0.3    Results for orders placed during the hospital encounter of 01/18/24    X-Ray Chest PA And Lateral    Narrative  EXAMINATION:  XR CHEST PA AND LATERAL    CLINICAL HISTORY:  Congenital malformation of heart, unspecified    TECHNIQUE:  PA and lateral views of the chest were performed.    COMPARISON:  None    FINDINGS:  Pacemaker is present on the left with electrodes to right atrium and ventricle.  Abandoned electrodes are present on the right.  The heart size and pulmonary vessels are normal.  Mediastinal contour is normal.  History indicates congenital heart disease.  The lungs are expanded and clear.  No lung consolidation or pleural fluid is detected.  The skeletal structures are intact.    Impression  No acute cardiopulmonary disease      Electronically signed by: Jaycob Chandler MD  Date:    01/18/2024  Time:    11:43        Assessment:       1. Obstructive sleep apnea    2. Chronic systolic congestive heart failure    3. Severe obesity (BMI 35.0-39.9) with comorbidity        Outpatient Encounter Medications as of 11/7/2024   Medication Sig Dispense Refill    ALBUTEROL SULFATE INHL albuterol sulfate Take No date recorded No form recorded No frequency recorded No route recorded No set duration recorded No set duration amount recorded active No dosage strength recorded No dosage strength units of measure recorded      aspirin (ECOTRIN) 81 MG EC tablet Take 81 mg by mouth once daily.       cetirizine (ZYRTEC) 10 MG tablet Take 1 tablet by mouth every morning.      ENTRESTO  mg per tablet TAKE 1 TABLET BY MOUTH TWICE DAILY 60 tablet 3    ferrous gluconate (FERGON) 324 MG tablet Take 324 mg by mouth.      fluticasone  propionate (FLONASE ALLERGY RELIEF NASL) Flonase Allergy Relief Take No date recorded No form recorded No frequency recorded No route recorded No set duration recorded No set duration amount recorded active No dosage strength recorded No dosage strength units of measure recorded      furosemide (LASIX) 40 MG tablet Take 1 tablet (40 mg total) by mouth 2 (two) times a day. 180 tablet 3    guaifenesin/phenylephrine HCl (MUCINEX COLD ORAL) Mucinex Take No date recorded No form recorded No frequency recorded No route recorded No set duration recorded No set duration amount recorded suspended No dosage strength recorded No dosage strength units of measure recorded      JARDIANCE 10 mg tablet TAKE 1 TABLET(10 MG) BY MOUTH DAILY 30 tablet 5    metoprolol succinate (TOPROL-XL) 25 MG 24 hr tablet TAKE 2 TABLETS(50 MG) BY MOUTH TWICE DAILY 120 tablet 5    propafenone (RHTHYMOL) 150 MG Tab Take 150 mg by mouth 2 (two) times daily. One tablet in the morning and two at night      sertraline (ZOLOFT) 100 MG tablet Take 100 mg by mouth.      spironolactone (ALDACTONE) 25 MG tablet Take 1 tablet (25 mg total) by mouth once daily. 30 tablet 11    TRI-SPRINTEC, 28, 0.18/0.215/0.25 mg-35 mcg (28) tablet Take 1 tablet by mouth once daily.       digoxin (LANOXIN) 250 mcg tablet Take 0.5 tablets (125 mcg total) by mouth once daily. 45 tablet 0    [DISCONTINUED] sacubitriL-valsartan (ENTRESTO)  mg per tablet Take 1 tablet by mouth 2 (two) times daily. 60 tablet 6     No facility-administered encounter medications on file as of 11/7/2024.     Orders Placed This Encounter   Procedures    CPAP/BIPAP SUPPLIES     Order Specific Question:   Length of need (1-99 months):     Answer:   99     Order Specific Question:   Choose ONE mask type and its corresponding cushions and/or pillows:     Answer:    Nasal Mask, 1 per 90 days:  Nasal Cushions, (6 per 90 days):  Nasal Pillows, (6 per 90 days)     Order Specific Question:    Choose EITHER Heated or Non-Heated Tubjing     Answer:    Non-Heated Tubing, 1 per 90 days     Order Specific Question:   All other supplies as needed as listed below:     Answer:    Headgear, 1 per 180 days     Order Specific Question:   All other supplies as needed as listed below:     Answer:    Disposable Filter, 6 per 90 days     Order Specific Question:   All other supplies as needed as listed below:     Answer:    Non-Disposable Filter, 1 per 180 days     Order Specific Question:   All other supplies as needed as listed below:     Answer:    Humidifier Chamber, 1 per 180 days     Order Specific Question:   All other supplies as needed as listed below:     Answer:    Chin Strap, 1 per 180 days     Plan:     Compliant with PAP and benefits from use. Follow up annually in the sleep clinic.    1. Obstructive sleep apnea  -     CPAP/BIPAP SUPPLIES    2. Chronic systolic congestive heart failure    3. Severe obesity (BMI 35.0-39.9) with comorbidity  Comments:  working on weight loss and now exercising.                        Elizabeth LeJeune, ACNP, ANP

## 2024-11-08 ENCOUNTER — PATIENT MESSAGE (OUTPATIENT)
Dept: CARDIOLOGY | Facility: CLINIC | Age: 44
End: 2024-11-08
Payer: COMMERCIAL

## 2024-11-08 DIAGNOSIS — Q24.9 ADULT CONGENITAL HEART DISEASE: ICD-10-CM

## 2024-11-08 DIAGNOSIS — I36.1 NON-RHEUMATIC TRICUSPID VALVE INSUFFICIENCY: ICD-10-CM

## 2024-11-08 DIAGNOSIS — Q24.9 HEART FAILURE DUE TO CONGENITAL HEART DISEASE: ICD-10-CM

## 2024-11-08 DIAGNOSIS — Q20.5 CONGENITALLY CORRECTED TRANSPOSITION OF GREAT ARTERIES: Primary | ICD-10-CM

## 2024-11-08 DIAGNOSIS — I50.9 HEART FAILURE DUE TO CONGENITAL HEART DISEASE: ICD-10-CM

## 2024-12-06 RX ORDER — SPIRONOLACTONE 25 MG/1
25 TABLET ORAL DAILY
Qty: 30 TABLET | Refills: 2 | Status: SHIPPED | OUTPATIENT
Start: 2024-12-06 | End: 2025-12-06

## 2024-12-16 DIAGNOSIS — Q24.9 ADULT CONGENITAL HEART DISEASE: ICD-10-CM

## 2024-12-16 DIAGNOSIS — I44.2 COMPLETE HEART BLOCK: Primary | ICD-10-CM

## 2024-12-16 DIAGNOSIS — Q20.5 CONGENITALLY CORRECTED TRANSPOSITION OF GREAT ARTERIES: ICD-10-CM

## 2025-02-03 DIAGNOSIS — Q20.5 CONGENITALLY CORRECTED TRANSPOSITION OF GREAT ARTERIES: ICD-10-CM

## 2025-02-04 DIAGNOSIS — I44.2 COMPLETE HEART BLOCK: ICD-10-CM

## 2025-02-04 DIAGNOSIS — Q24.9 ADULT CONGENITAL HEART DISEASE: Primary | ICD-10-CM

## 2025-02-06 ENCOUNTER — HOSPITAL ENCOUNTER (OUTPATIENT)
Dept: CARDIOLOGY | Facility: HOSPITAL | Age: 45
Discharge: HOME | End: 2025-02-06
Attending: PEDIATRICS
Payer: COMMERCIAL

## 2025-02-06 ENCOUNTER — OFFICE VISIT (OUTPATIENT)
Dept: CARDIOLOGY | Facility: CLINIC | Age: 45
End: 2025-02-06
Payer: COMMERCIAL

## 2025-02-06 ENCOUNTER — CLINICAL SUPPORT (OUTPATIENT)
Dept: CARDIOLOGY | Facility: CLINIC | Age: 45
End: 2025-02-06
Attending: PEDIATRICS
Payer: COMMERCIAL

## 2025-02-06 ENCOUNTER — HOSPITAL ENCOUNTER (OUTPATIENT)
Dept: CARDIOLOGY | Facility: CLINIC | Age: 45
Discharge: HOME | End: 2025-02-06
Payer: COMMERCIAL

## 2025-02-06 VITALS
SYSTOLIC BLOOD PRESSURE: 108 MMHG | OXYGEN SATURATION: 97 % | HEART RATE: 73 BPM | WEIGHT: 229.75 LBS | BODY MASS INDEX: 34.82 KG/M2 | DIASTOLIC BLOOD PRESSURE: 53 MMHG | HEIGHT: 68 IN

## 2025-02-06 DIAGNOSIS — Q20.5 CONGENITALLY CORRECTED TRANSPOSITION OF GREAT ARTERIES: ICD-10-CM

## 2025-02-06 DIAGNOSIS — Q24.9 HEART FAILURE DUE TO CONGENITAL HEART DISEASE: Primary | ICD-10-CM

## 2025-02-06 DIAGNOSIS — Q24.9 ADULT CONGENITAL HEART DISEASE: ICD-10-CM

## 2025-02-06 DIAGNOSIS — I36.1 NON-RHEUMATIC TRICUSPID VALVE INSUFFICIENCY: ICD-10-CM

## 2025-02-06 DIAGNOSIS — G47.33 OBSTRUCTIVE SLEEP APNEA: ICD-10-CM

## 2025-02-06 DIAGNOSIS — Q24.9 HEART FAILURE DUE TO CONGENITAL HEART DISEASE: ICD-10-CM

## 2025-02-06 DIAGNOSIS — I50.9 HEART FAILURE DUE TO CONGENITAL HEART DISEASE: ICD-10-CM

## 2025-02-06 DIAGNOSIS — I44.2 COMPLETE HEART BLOCK: ICD-10-CM

## 2025-02-06 DIAGNOSIS — I50.9 HEART FAILURE DUE TO CONGENITAL HEART DISEASE: Primary | ICD-10-CM

## 2025-02-06 LAB
AV DELAY - LONGEST: 200 MSEC
CV ECHO LV RWT: 0.18 CM
ECHO EF ESTIMATED: 54 %
ECHO LV POSTERIOR WALL: 0.6 CM (ref 0.6–1.1)
FRACTIONAL SHORTENING: 29.9 % (ref 28–44)
IMPEDANCE RA LEAD (NATIVE): 437 OHMS
IMPEDANCE RA LEAD: 361 OHMS
INTERVENTRICULAR SEPTUM: 0.5 CM (ref 0.6–1.1)
LA MAJOR: 6.6 CM
LA MINOR: 6.4 CM
LA WIDTH: 4.3 CM
LEFT ATRIUM SIZE: 4.9 CM
LEFT ATRIUM VOLUME: 116 CM3
LEFT INTERNAL DIMENSION IN SYSTOLE: 4.7 CM (ref 2.1–4)
LEFT VENTRICLE DIASTOLIC VOLUME: 227.84 ML
LEFT VENTRICLE SYSTOLIC VOLUME: 104.2 ML
LEFT VENTRICULAR INTERNAL DIMENSION IN DIASTOLE: 6.7 CM (ref 3.5–6)
LEFT VENTRICULAR MASS: 145.2 G
OHS CV DC PP MS1: 0.4 MS
OHS CV DC PP MS2: 0.4 MS
OHS CV DC PP V1: NORMAL V
OHS CV DC PP V2: 2 V
OHS CV RV/LV RATIO: 0.82 CM
OHS QRS DURATION: 210 MS
OHS QTC CALCULATION: 525 MS
P/R-WAVE RA LEAD: 4.4 MV
PV DELAY - LONGEST: 170 MSEC
PV MEAN GRADIENT: 3 MMHG
PV MV: 0.89 M/S
PV PEAK GRADIENT: 6 MMHG
PV PEAK VELOCITY: 1.18 M/S
RA MAJOR: 5.38 CM
RA WIDTH: 5.86 CM
RIGHT VENTRICLE DIASTOLIC BASEL DIMENSION: 5.5 CM
RV TISSUE DOPPLER FREE WALL SYSTOLIC VELOCITY 1 (APICAL 4 CHAMBER VIEW): 15.71 CM/S
THRESHOLD MS RA LEAD (NATIVE): 0.4 MS
THRESHOLD MS RA LEAD: 0.4 MS
THRESHOLD V RA LEAD (NATIVE): 0.75 V
THRESHOLD V RA LEAD: 0.5 V

## 2025-02-06 PROCEDURE — 1160F RVW MEDS BY RX/DR IN RCRD: CPT | Mod: CPTII,S$GLB,, | Performed by: PEDIATRICS

## 2025-02-06 PROCEDURE — 4010F ACE/ARB THERAPY RXD/TAKEN: CPT | Mod: CPTII,S$GLB,, | Performed by: PEDIATRICS

## 2025-02-06 PROCEDURE — 93005 ELECTROCARDIOGRAM TRACING: CPT | Mod: S$GLB,,, | Performed by: PEDIATRICS

## 2025-02-06 PROCEDURE — 99999 PR PBB SHADOW E&M-EST. PATIENT-LVL III: CPT | Mod: PBBFAC,,, | Performed by: PEDIATRICS

## 2025-02-06 PROCEDURE — 93010 ELECTROCARDIOGRAM REPORT: CPT | Mod: S$GLB,,, | Performed by: INTERNAL MEDICINE

## 2025-02-06 PROCEDURE — 3078F DIAST BP <80 MM HG: CPT | Mod: CPTII,S$GLB,, | Performed by: PEDIATRICS

## 2025-02-06 PROCEDURE — 93325 DOPPLER ECHO COLOR FLOW MAPG: CPT | Mod: 26,,, | Performed by: PEDIATRICS

## 2025-02-06 PROCEDURE — 1159F MED LIST DOCD IN RCRD: CPT | Mod: CPTII,S$GLB,, | Performed by: PEDIATRICS

## 2025-02-06 PROCEDURE — 99214 OFFICE O/P EST MOD 30 MIN: CPT | Mod: S$GLB,,, | Performed by: PEDIATRICS

## 2025-02-06 PROCEDURE — 3074F SYST BP LT 130 MM HG: CPT | Mod: CPTII,S$GLB,, | Performed by: PEDIATRICS

## 2025-02-06 PROCEDURE — 93325 DOPPLER ECHO COLOR FLOW MAPG: CPT

## 2025-02-06 PROCEDURE — 99999 PR PBB SHADOW E&M-EST. PATIENT-LVL I: CPT | Mod: PBBFAC,,,

## 2025-02-06 PROCEDURE — 93320 DOPPLER ECHO COMPLETE: CPT | Mod: 26,,, | Performed by: PEDIATRICS

## 2025-02-06 PROCEDURE — 93280 PM DEVICE PROGR EVAL DUAL: CPT | Mod: S$GLB,,, | Performed by: PEDIATRICS

## 2025-02-06 PROCEDURE — 93303 ECHO TRANSTHORACIC: CPT | Mod: 26,,, | Performed by: PEDIATRICS

## 2025-02-06 PROCEDURE — 3008F BODY MASS INDEX DOCD: CPT | Mod: CPTII,S$GLB,, | Performed by: PEDIATRICS

## 2025-02-06 RX ORDER — SPIRONOLACTONE 25 MG/1
25 TABLET ORAL DAILY
Qty: 30 TABLET | Refills: 2 | Status: SHIPPED | OUTPATIENT
Start: 2025-02-06 | End: 2026-02-06

## 2025-02-06 RX ORDER — FUROSEMIDE 40 MG/1
40 TABLET ORAL 2 TIMES DAILY
Qty: 180 TABLET | Refills: 3 | Status: SHIPPED | OUTPATIENT
Start: 2025-02-06

## 2025-02-06 NOTE — PROGRESS NOTES
2025    re:Siria Villegas  :1980     Nick Elaine MD  [address] [facility] [address]    Dr. Matt Pacheco    [facility]    Siria Villegas is a 44 y.o. female is seen today for an initial consultation in my [facility] at the request of Dr. Matt Dumont, her primary cardiologist in [place].  To summarize, her diagnoses are as follows:  1.  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  Reduced systemic right ventricular function, echo stable   - much improved symptoms on heart failure medications  3.  Normal appearing tricuspid valve with mild to moderrate tricuspid insufficiency  4.  History of supraventricular arrhythmias.  Some nonsustained ventricular tachycardia noted on pacemaker evaluation.  5.  Complete heart block with transvenous pacemaker - wide complex     My recommendations are as follows:  1.  Continue current medications  2.  Dr. Scanlon communicating with [facility] EP team regarding lead extraction, BiV pacing, and ICD  3.  Follow up as planned with Dr. Grey  4.  To see EP today  5.  Continue CPAP for JOSE ROBERTO  6.  Increase Lasix to twice a day  7.  At a minimum, follow up with me in 1 year with repeat echo.  However, will likely see her sooner.    Discussion:  I again had a long discussion with the patient:  1. She has a systemic right ventricle with decreased ventricular function.  Although there is some limited data suggesting clinical improvement with goal-directed medical therapy in systemic right ventricule patients, that data is certainly limited.  I am really pleased with her symptomatic improvement on her current therapy, and she will continue to follow-up with the heart failure team.  I suspect treatment of her obstructive sleep apnea also plays a positive role in her symptomatic improvement.  2. She has a wide  complex QRS related to her pacing.  There is also some limited data about CRT in these systemic right ventricle patients.  It is somewhat complicated given the number of leads she already has in her heart, and she would likely need a lead extraction procedure.  She is highly complex, and we are in discussion with the [facility] team.    3. She has decreased systemic right ventricular function and a history of nonsustained ventricular tachycardia.  I do think she would benefit from a defibrillator.      History of present illness:  I last saw her a year ago.  She has actually done really well since that time.  Her exercise tolerance is definitely improved.  She is going to the gym.  She wears CPAP at night for her obstructive sleep apnea.  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  Occasional brief palpitations.  Occasional orthostatic dizziness that seems to be associated with lower blood pressures.  No syncope.  No edema.  No chest pain.    She works as a teacher.    The review of systems is as noted above. It is otherwise negative for other symptoms related to the general, neurological, psychiatric, endocrine, gastrointestinal, genitourinary, respiratory, dermatologic, musculoskeletal, hematologic, and immunologic systems.    Past Medical History:   Diagnosis Date    Asthma     Congestive heart failure      Past Surgical History:   Procedure Laterality Date    CARDIAC PACEMAKER PLACEMENT  07/26/2018    CYST REMOVAL      age 2 years old, from eyebrow area     Family History   Problem Relation Name Age of Onset    Cancer Mother      Stroke Mother      Heart failure Mother      Hyperlipidemia Father      Skin cancer Father       Social History     Socioeconomic History    Marital status: Single   Tobacco Use    Smoking status: Never    Smokeless tobacco: Never   Substance and Sexual Activity    Alcohol use: No    Drug use: No   Social History Narrative    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  No children.     Social Drivers of Health     Financial Resource Strain: High Risk (1/13/2025)    [facility]    Overall Financial Resource Strain (CARDIA)     Difficulty of Paying Living Expenses: Very hard   Food Insecurity: Food Insecurity Present (1/13/2025)    [facility]    Hunger Vital Sign     Worried About Running Out of Food in the Last Year: Sometimes true     Ran Out of Food in the Last Year: Sometimes true   Transportation Needs: No Transportation Needs (1/13/2025)    [facility]    PRAPARE - Transportation     Lack of Transportation (Medical): No     Lack of Transportation (Non-Medical): No   Physical Activity: Sufficiently Active (1/13/2025)    [facility]    Exercise Vital Sign     Days of Exercise per Week: 3 days     Minutes of Exercise per Session: 60 min   Stress: Stress Concern Present (1/13/2025)    [facility]    [employer] - Occupational Stress Questionnaire     Feeling of Stress : Rather much   Housing Stability: Low Risk  (1/13/2025)    [facility]    Housing Stability Vital Sign     Unable to Pay for Housing in the Last Year: No     Number of Times Moved in the Last Year: 0     Homeless in the Last Year: No     Current Outpatient Medications on File Prior to Visit   Medication Sig Dispense Refill    ALBUTEROL SULFATE INHL albuterol sulfate Take No date recorded No form recorded No frequency recorded No route recorded No set  duration recorded No set duration amount recorded active No dosage strength recorded No dosage strength units of measure recorded      aspirin (ECOTRIN) 81 MG EC tablet Take 81 mg by mouth once daily.       cetirizine (ZYRTEC) 10 MG tablet Take 1 tablet by mouth every morning.      digoxin (LANOXIN) 250 mcg tablet Take 0.5 tablets (125 mcg total) by mouth once daily. 45 tablet 0    ENTRESTO  mg per tablet TAKE 1 TABLET BY MOUTH TWICE DAILY 60 tablet 3    ferrous gluconate (FERGON) 324 MG tablet Take 324 mg by mouth daily as needed. Takes only while on menstrual cycle      fluticasone propionate (FLONASE ALLERGY RELIEF NASL) Flonase Allergy Relief Take No date recorded No form recorded No frequency recorded No route recorded No set duration recorded No set duration amount recorded active No dosage strength recorded No dosage strength units of measure recorded      furosemide (LASIX) 40 MG tablet TAKE 1 TABLET(40 MG) BY MOUTH TWICE DAILY 180 tablet 3    guaifenesin/phenylephrine HCl (MUCINEX COLD ORAL) Mucinex Take No date recorded No form recorded No frequency recorded No route recorded No set duration recorded No set duration amount recorded suspended No dosage strength recorded No dosage strength units of measure recorded      JARDIANCE 10 mg tablet TAKE 1 TABLET(10 MG) BY MOUTH DAILY 30 tablet 5    metoprolol succinate (TOPROL-XL) 25 MG 24 hr tablet TAKE 2 TABLETS(50 MG) BY MOUTH TWICE DAILY 120 tablet 5    propafenone (RHTHYMOL) 150 MG Tab Take 150 mg by mouth 2 (two) times daily. One tablet in the morning and two at night      sertraline (ZOLOFT) 100 MG tablet Take 100 mg by mouth once daily.      spironolactone (ALDACTONE) 25 MG tablet Take 1 tablet (25 mg total) by mouth once daily. 30 tablet 2    TRI-SPRINTEC, 28, 0.18/0.215/0.25 mg-35 mcg (28) tablet Take 1 tablet by mouth once daily.       [DISCONTINUED] furosemide (LASIX) 40 MG tablet Take 1 tablet (40 mg total) by mouth 2 (two) times a day. 180  "tablet 3     No current facility-administered medications on file prior to visit.     Review of patient's allergies indicates:   Allergen Reactions    Penicillins Hives     As a child.     BP (!) 108/53 (BP Location: Right arm, Patient Position: Sitting)   Pulse 73   Ht 5' 8" (1.727 m)   Wt 104.2 kg (229 lb 11.5 oz)   SpO2 97%   BMI 34.93 kg/m²   In general, she is an obese but otherwise very healthy-appearing nondysmorphic female in no apparent distress.  The eyes, nares, and oropharynx are clear.  Eyelids and conjunctiva are normal without drainage or erythema.  Pupils equal and round bilaterally.  The head is normocephalic and atraumatic.  The neck is supple without jugular venous distention or thyroid enlargement.  The lungs are clear to auscultation bilaterally.  There are no scars on the chest wall.  The point of maximum impulse is somewhat more midline than typical.  The 1st heart sound is normal. The 2nd heart sound is single.  I do not hear a murmur.  The pacemaker is positioned in the right upper chest.  The abdominal exam is benign without hepatosplenomegaly, tenderness, or distention.  Pulses are normal in all 4 extremities with brisk capillary refill and no clubbing, cyanosis, or edema.  No rashes are noted.     Results for orders placed during the hospital encounter of 02/06/25    Echo    Interpretation Summary  CONGENITAL CARDIAC HISTORY:  L- transposition of the great arteries without other associated cardiac defects  Complete heart block  S/P Pacemaker    SEGMENTAL CARDIAC CONNECTIONS (previously demonstrated):  Abdominal situs solitus.  Atrial situs solitus.  Atrioventricular alignment is discordant.  L-loop ventricles.  Normal structure of systemic tricuspid valve.  Normal structure of subpulmonary mitral valve.  Qualitatively normal appearance of the subpulmonary left ventricle.  Qualitatively severe dilation and at least mild hypertrophy of systemic right ventricle.  The ventriculoarterial " alignment is discordant.  The pulmonary valve is structurally normal.  Normal trileaflet aortic valve.  Cardiac position is levocardia.  Normal size aortic arch with no evidence of coarctation.  Left aortic branching pattern (demonstrated in this study).      IMPRESSION:  Overall impression of no significant change compared to previous study.  Qualitative impression of mildly dilated right atrium with two pacing wires demonstrated.  Some images suggest that both pacing wires cross the subpulmonary mitral valve to the subpulmonary left ventricle.  There is mild-to-moderate associated subpulmonary mitral insufficiency with no evidence of stenosis.  Qualitative impression normal size and structure of the subpulmonary left ventricle with good systolic function.  Subpulmonary left ventricular pressure estimated >21 mmHg from incompletely developed Doppler profile.  Normal flow across left ventricular outflow tract and pulmonary valve with no significant pulmonary insufficiency demonstrated.  Normal size confluent pulmonary arteries demonstrated.  The left atrial visually estimated as moderate to severely enlarged.  Systemic tricuspid valve with no evidence of stenosis and mild to moderate insufficiency.  Severe dilation and mild to moderate hypertrophy of the systemic right ventricle.  Paradoxical septal motion with poor movement of the LV free wall, shortening fraction equal 29% and EF estimated 30 -35% from apical views.  Normal flow is demonstrated across the aortic valve with no significant insufficiency.  Aortic dimensions:  Sinuses of Valsalva  = 31 mm.  Normal size aorta with left aortic branching and no evidence of coarctation.  No pericardial effusion.      EKG today:  AV dual-paced rhythm   Abnormal ECG   When compared with ECG of 15-Jul-2024 10:34,   Vent. rate has decreased by  15 bpm   Confirmed by Germain Jensen ([id number]) on 2/6/2025 9:55:44 A     I reviewed her echo from July 2023:  l-transposition of the  great arteries (congenitally corrected transposition). No septal defects present Mild tricuspid insuffiency (systemic AVV). Moderate to severe right ventricular enlargement (systemic ventricle). Mildly depressed right ventricular contractility (systemic ventricle). No pericardial effusion.     CMP  Sodium   Date Value Ref Range Status   11/05/2024 139 136 - 145 mmol/L Final     Potassium   Date Value Ref Range Status   11/05/2024 3.8 3.5 - 5.1 mmol/L Final     Chloride   Date Value Ref Range Status   11/05/2024 105 95 - 110 mmol/L Final     CO2   Date Value Ref Range Status   11/05/2024 25 23 - 29 mmol/L Final     Glucose   Date Value Ref Range Status   11/05/2024 89 70 - 110 mg/dL Final     BUN   Date Value Ref Range Status   11/05/2024 12 6 - 20 mg/dL Final     Creatinine   Date Value Ref Range Status   11/05/2024 0.7 0.5 - 1.4 mg/dL Final     Calcium   Date Value Ref Range Status   11/05/2024 9.1 8.7 - 10.5 mg/dL Final     Total Protein   Date Value Ref Range Status   11/05/2024 7.4 6.0 - 8.4 g/dL Final     Albumin   Date Value Ref Range Status   11/05/2024 3.3 (L) 3.5 - 5.2 g/dL Final     Total Bilirubin   Date Value Ref Range Status   11/05/2024 0.3 0.1 - 1.0 mg/dL Final     Comment:     For infants and newborns, interpretation of results should be based  on gestational age, weight and in agreement with clinical  observations.    Premature Infant recommended reference ranges:  Up to 24 hours.............<8.0 mg/dL  Up to 48 hours............<12.0 mg/dL  3-5 days..................<15.0 mg/dL  6-29 days.................<15.0 mg/dL       Alkaline Phosphatase   Date Value Ref Range Status   11/05/2024 99 40 - 150 U/L Final     AST   Date Value Ref Range Status   11/05/2024 12 10 - 40 U/L Final     ALT   Date Value Ref Range Status   11/05/2024 10 10 - 44 U/L Final     Anion Gap   Date Value Ref Range Status   11/05/2024 9 8 - 16 mmol/L Final     eGFR   Date Value Ref Range Status   11/05/2024 >60.0 >60 mL/min/1.73  m^2 Final         BNP   Date Value Ref Range Status   04/22/2024 99 0 - 99 pg/mL Final     Comment:     Values of less than 100 pg/ml are consistent with non-CHF populations.        Thank you for referring this patient to our clinic.  Please call with any questions.    Sincerely,        Conor Bernardo MD  Pediatric Cardiology  Adult Congenital Heart Disease  Pediatric Heart Failure and Transplantation  [facility] [address] [place]  [address]  [phone]

## 2025-02-18 ENCOUNTER — PATIENT MESSAGE (OUTPATIENT)
Dept: TRANSPLANT | Facility: CLINIC | Age: 45
End: 2025-02-18
Payer: COMMERCIAL

## 2025-03-07 RX ORDER — EMPAGLIFLOZIN 10 MG/1
10 TABLET, FILM COATED ORAL
Qty: 30 TABLET | Refills: 5 | Status: SHIPPED | OUTPATIENT
Start: 2025-03-07

## 2025-03-10 RX ORDER — SPIRONOLACTONE 25 MG/1
TABLET ORAL
Qty: 30 TABLET | Refills: 11 | Status: SHIPPED | OUTPATIENT
Start: 2025-03-10

## 2025-03-10 RX ORDER — SACUBITRIL AND VALSARTAN 97; 103 MG/1; MG/1
1 TABLET, FILM COATED ORAL 2 TIMES DAILY
Qty: 60 TABLET | Refills: 3 | Status: SHIPPED | OUTPATIENT
Start: 2025-03-10

## 2025-07-04 DIAGNOSIS — Q20.5 CONGENITALLY CORRECTED TRANSPOSITION OF GREAT ARTERIES: ICD-10-CM

## 2025-07-07 RX ORDER — DIGOXIN 250 MCG
125 TABLET ORAL DAILY
Qty: 45 TABLET | Refills: 0 | Status: SHIPPED | OUTPATIENT
Start: 2025-07-07 | End: 2025-10-05

## 2025-07-07 NOTE — TELEPHONE ENCOUNTER
Patient is 1.5 years overdue for 1 month follow up. Called patient and left voicemail instructing to call back and schedule follow up.

## 2025-07-07 NOTE — TELEPHONE ENCOUNTER
Pt last saw Dr. Scanlon and Dr. Bernardo on 2/6/25 - Sent in refill via ePrescribe to designated/requested pharmacy. Scheduled pt with Dr. Dumont for 7/22.

## 2025-07-24 ENCOUNTER — PATIENT MESSAGE (OUTPATIENT)
Dept: PEDIATRIC CARDIOLOGY | Facility: CLINIC | Age: 45
End: 2025-07-24
Payer: COMMERCIAL

## 2025-08-06 DIAGNOSIS — Q20.5 CONGENITALLY CORRECTED TRANSPOSITION OF GREAT ARTERIES: Primary | ICD-10-CM

## 2025-08-10 RX ORDER — SACUBITRIL AND VALSARTAN 97; 103 MG/1; MG/1
1 TABLET ORAL 2 TIMES DAILY
Qty: 60 TABLET | Refills: 3 | Status: CANCELLED | OUTPATIENT
Start: 2025-08-10

## 2025-08-11 DIAGNOSIS — I50.9 HEART FAILURE DUE TO CONGENITAL HEART DISEASE: Primary | ICD-10-CM

## 2025-08-11 DIAGNOSIS — Q24.9 HEART FAILURE DUE TO CONGENITAL HEART DISEASE: Primary | ICD-10-CM

## 2025-08-13 RX ORDER — SACUBITRIL AND VALSARTAN 97; 103 MG/1; MG/1
1 TABLET, FILM COATED ORAL 2 TIMES DAILY
Qty: 180 TABLET | Refills: 3 | Status: SHIPPED | OUTPATIENT
Start: 2025-08-13

## 2025-08-27 ENCOUNTER — HOSPITAL ENCOUNTER (OUTPATIENT)
Dept: PEDIATRIC CARDIOLOGY | Facility: HOSPITAL | Age: 45
Discharge: HOME | End: 2025-08-27
Attending: PEDIATRICS
Payer: COMMERCIAL

## 2025-08-27 ENCOUNTER — CLINICAL SUPPORT (OUTPATIENT)
Dept: PEDIATRIC CARDIOLOGY | Facility: CLINIC | Age: 45
End: 2025-08-27
Payer: COMMERCIAL

## 2025-08-27 DIAGNOSIS — I50.22 CHRONIC SYSTOLIC CONGESTIVE HEART FAILURE: ICD-10-CM

## 2025-08-27 DIAGNOSIS — I44.2 COMPLETE HEART BLOCK: ICD-10-CM

## 2025-08-27 DIAGNOSIS — Q24.9 ADULT CONGENITAL HEART DISEASE: ICD-10-CM

## 2025-08-27 DIAGNOSIS — Q20.5 CONGENITALLY CORRECTED TRANSPOSITION OF GREAT ARTERIES: ICD-10-CM

## 2025-08-27 PROCEDURE — 99999 PR PBB SHADOW E&M-EST. PATIENT-LVL I: CPT | Mod: PBBFAC,,,

## 2025-08-27 PROCEDURE — 93000 ELECTROCARDIOGRAM COMPLETE: CPT | Mod: S$GLB,,, | Performed by: PEDIATRICS

## 2025-08-29 LAB
OHS QRS DURATION: 198 MS
OHS QTC CALCULATION: 550 MS